# Patient Record
Sex: FEMALE | Race: WHITE | NOT HISPANIC OR LATINO | Employment: OTHER | URBAN - METROPOLITAN AREA
[De-identification: names, ages, dates, MRNs, and addresses within clinical notes are randomized per-mention and may not be internally consistent; named-entity substitution may affect disease eponyms.]

---

## 2023-05-09 LAB
ALANINE AMINOTRANSFERASE (SGPT) (U/L) IN SER/PLAS: 22 U/L (ref 7–45)
ALBUMIN (G/DL) IN SER/PLAS: 4 G/DL (ref 3.4–5)
ALKALINE PHOSPHATASE (U/L) IN SER/PLAS: 175 U/L (ref 33–136)
ANION GAP IN SER/PLAS: 11 MMOL/L (ref 10–20)
ASPARTATE AMINOTRANSFERASE (SGOT) (U/L) IN SER/PLAS: 25 U/L (ref 9–39)
BASOPHILS (10*3/UL) IN BLOOD BY AUTOMATED COUNT: 0.02 X10E9/L (ref 0–0.1)
BASOPHILS/100 LEUKOCYTES IN BLOOD BY AUTOMATED COUNT: 0.5 % (ref 0–2)
BILIRUBIN TOTAL (MG/DL) IN SER/PLAS: 0.6 MG/DL (ref 0–1.2)
CALCIUM (MG/DL) IN SER/PLAS: 9.3 MG/DL (ref 8.6–10.3)
CARBON DIOXIDE, TOTAL (MMOL/L) IN SER/PLAS: 28 MMOL/L (ref 21–32)
CHLORIDE (MMOL/L) IN SER/PLAS: 104 MMOL/L (ref 98–107)
CREATININE (MG/DL) IN SER/PLAS: 0.83 MG/DL (ref 0.5–1.05)
EOSINOPHILS (10*3/UL) IN BLOOD BY AUTOMATED COUNT: 0.11 X10E9/L (ref 0–0.7)
EOSINOPHILS/100 LEUKOCYTES IN BLOOD BY AUTOMATED COUNT: 2.8 % (ref 0–6)
ERYTHROCYTE DISTRIBUTION WIDTH (RATIO) BY AUTOMATED COUNT: 13.8 % (ref 11.5–14.5)
ERYTHROCYTE MEAN CORPUSCULAR HEMOGLOBIN CONCENTRATION (G/DL) BY AUTOMATED: 32.3 G/DL (ref 32–36)
ERYTHROCYTE MEAN CORPUSCULAR VOLUME (FL) BY AUTOMATED COUNT: 91 FL (ref 80–100)
ERYTHROCYTES (10*6/UL) IN BLOOD BY AUTOMATED COUNT: 4.61 X10E12/L (ref 4–5.2)
GFR FEMALE: 77 ML/MIN/1.73M2
GLUCOSE (MG/DL) IN SER/PLAS: 123 MG/DL (ref 74–99)
HEMATOCRIT (%) IN BLOOD BY AUTOMATED COUNT: 42.1 % (ref 36–46)
HEMOGLOBIN (G/DL) IN BLOOD: 13.6 G/DL (ref 12–16)
IMMATURE GRANULOCYTES/100 LEUKOCYTES IN BLOOD BY AUTOMATED COUNT: 0.3 % (ref 0–0.9)
LEUKOCYTES (10*3/UL) IN BLOOD BY AUTOMATED COUNT: 3.9 X10E9/L (ref 4.4–11.3)
LYMPHOCYTES (10*3/UL) IN BLOOD BY AUTOMATED COUNT: 0.82 X10E9/L (ref 1.2–4.8)
LYMPHOCYTES/100 LEUKOCYTES IN BLOOD BY AUTOMATED COUNT: 21.2 % (ref 13–44)
MONOCYTES (10*3/UL) IN BLOOD BY AUTOMATED COUNT: 0.48 X10E9/L (ref 0.1–1)
MONOCYTES/100 LEUKOCYTES IN BLOOD BY AUTOMATED COUNT: 12.4 % (ref 2–10)
NEUTROPHILS (10*3/UL) IN BLOOD BY AUTOMATED COUNT: 2.43 X10E9/L (ref 1.2–7.7)
NEUTROPHILS/100 LEUKOCYTES IN BLOOD BY AUTOMATED COUNT: 62.8 % (ref 40–80)
NRBC (PER 100 WBCS) BY AUTOMATED COUNT: 0 /100 WBC (ref 0–0)
PLATELETS (10*3/UL) IN BLOOD AUTOMATED COUNT: 220 X10E9/L (ref 150–450)
POTASSIUM (MMOL/L) IN SER/PLAS: 4.4 MMOL/L (ref 3.5–5.3)
PROTEIN TOTAL: 7 G/DL (ref 6.4–8.2)
SODIUM (MMOL/L) IN SER/PLAS: 139 MMOL/L (ref 136–145)
UREA NITROGEN (MG/DL) IN SER/PLAS: 14 MG/DL (ref 6–23)

## 2023-05-26 LAB
ALBUMIN (MG/L) IN URINE: <7 MG/L
ALBUMIN/CREATININE (UG/MG) IN URINE: NORMAL UG/MG CRT (ref 0–30)
CHOLESTEROL (MG/DL) IN SER/PLAS: 141 MG/DL (ref 0–199)
CHOLESTEROL IN HDL (MG/DL) IN SER/PLAS: 57.1 MG/DL
CHOLESTEROL/HDL RATIO: 2.5
CREATININE (MG/DL) IN URINE: 90.5 MG/DL (ref 20–320)
ESTIMATED AVERAGE GLUCOSE FOR HBA1C: 171 MG/DL
HEMOGLOBIN A1C/HEMOGLOBIN TOTAL IN BLOOD: 7.6 %
LDL: 69 MG/DL (ref 0–99)
TRIGLYCERIDE (MG/DL) IN SER/PLAS: 76 MG/DL (ref 0–149)
VLDL: 15 MG/DL (ref 0–40)

## 2023-07-10 ENCOUNTER — OFFICE VISIT (OUTPATIENT)
Dept: PRIMARY CARE | Facility: CLINIC | Age: 68
End: 2023-07-10
Payer: MEDICARE

## 2023-07-10 VITALS
OXYGEN SATURATION: 96 % | BODY MASS INDEX: 27.6 KG/M2 | DIASTOLIC BLOOD PRESSURE: 71 MMHG | WEIGHT: 150 LBS | SYSTOLIC BLOOD PRESSURE: 110 MMHG | HEIGHT: 62 IN | HEART RATE: 74 BPM

## 2023-07-10 DIAGNOSIS — Z12.11 ENCOUNTER FOR SCREENING FOR MALIGNANT NEOPLASM OF COLON: ICD-10-CM

## 2023-07-10 DIAGNOSIS — E11.9 CONTROLLED TYPE 2 DIABETES MELLITUS WITHOUT COMPLICATION, WITHOUT LONG-TERM CURRENT USE OF INSULIN (MULTI): Primary | ICD-10-CM

## 2023-07-10 PROCEDURE — 3074F SYST BP LT 130 MM HG: CPT | Performed by: FAMILY MEDICINE

## 2023-07-10 PROCEDURE — 3078F DIAST BP <80 MM HG: CPT | Performed by: FAMILY MEDICINE

## 2023-07-10 PROCEDURE — 1159F MED LIST DOCD IN RCRD: CPT | Performed by: FAMILY MEDICINE

## 2023-07-10 PROCEDURE — 1125F AMNT PAIN NOTED PAIN PRSNT: CPT | Performed by: FAMILY MEDICINE

## 2023-07-10 PROCEDURE — 1160F RVW MEDS BY RX/DR IN RCRD: CPT | Performed by: FAMILY MEDICINE

## 2023-07-10 PROCEDURE — 99214 OFFICE O/P EST MOD 30 MIN: CPT | Performed by: FAMILY MEDICINE

## 2023-07-10 PROCEDURE — 3051F HG A1C>EQUAL 7.0%<8.0%: CPT | Performed by: FAMILY MEDICINE

## 2023-07-10 RX ORDER — FLUTICASONE PROPIONATE AND SALMETEROL 50; 250 UG/1; UG/1
POWDER RESPIRATORY (INHALATION)
COMMUNITY
Start: 2023-06-26 | End: 2024-01-08 | Stop reason: ALTCHOICE

## 2023-07-10 RX ORDER — LOVASTATIN 40 MG/1
40 TABLET ORAL EVERY EVENING
COMMUNITY

## 2023-07-10 RX ORDER — FLUTICASONE PROPIONATE 50 MCG
2 SPRAY, SUSPENSION (ML) NASAL 2 TIMES DAILY
COMMUNITY

## 2023-07-10 RX ORDER — OMEPRAZOLE 20 MG/1
20 CAPSULE, DELAYED RELEASE ORAL DAILY
COMMUNITY

## 2023-07-10 RX ORDER — LANCETS 33 GAUGE
EACH MISCELLANEOUS DAILY
COMMUNITY
Start: 2023-07-01

## 2023-07-10 RX ORDER — LANCETS
EACH MISCELLANEOUS DAILY
COMMUNITY
Start: 2023-01-16 | End: 2024-02-27 | Stop reason: WASHOUT

## 2023-07-10 RX ORDER — MINERAL OIL AND WHITE PETROLATUM 30; 940 MG/G; MG/G
OINTMENT OPHTHALMIC
COMMUNITY
Start: 2023-07-01

## 2023-07-10 RX ORDER — LANCETS 30 GAUGE
EACH MISCELLANEOUS
COMMUNITY
Start: 2023-01-16

## 2023-07-10 RX ORDER — ALBUTEROL SULFATE 90 UG/1
2 AEROSOL, METERED RESPIRATORY (INHALATION) EVERY 4 HOURS PRN
COMMUNITY

## 2023-07-10 RX ORDER — BLOOD SUGAR DIAGNOSTIC
STRIP MISCELLANEOUS
COMMUNITY
Start: 2023-07-01

## 2023-07-10 RX ORDER — METFORMIN HYDROCHLORIDE 500 MG/1
500 TABLET ORAL 2 TIMES DAILY
COMMUNITY
Start: 2023-05-28 | End: 2024-02-27 | Stop reason: SINTOL

## 2023-07-10 RX ORDER — ATENOLOL 50 MG/1
25 TABLET ORAL DAILY
COMMUNITY

## 2023-07-10 ASSESSMENT — ENCOUNTER SYMPTOMS
RESPIRATORY NEGATIVE: 1
MUSCULOSKELETAL NEGATIVE: 1
NEUROLOGICAL NEGATIVE: 1
GASTROINTESTINAL NEGATIVE: 1

## 2023-07-10 NOTE — PROGRESS NOTES
Subjective   Patient ID: Seble Reynolds is a 67 y.o. female.    HPI  Pt with hx sarcoid and wheeze hx of niddm no complaints no sob patient denies any fever or chills.  Has a history of hypertension hyperlipidemia and elevated blood sugars patient needs follow-up blood work another hemoglobin A1c talked about diet talked about exercise.  Review of Systems   HENT: Negative.     Respiratory: Negative.     Gastrointestinal: Negative.    Genitourinary: Negative.    Musculoskeletal: Negative.    Neurological: Negative.        Objective   Physical Exam  General no acute process no icterus well-hydrated alert active oriented    HEENT normocephalic no palpable tenderness eyes pupils equal reactive light and accommodation extraocular muscles intact no icterus and/or erythema ears benign external auditory canal no gross deformities nose no discharge drainage erythema bleeding throat no erythema.    Heart regular rate and rhythm without S3-S4 or murmur    Lungs clear to auscultation x2 no rales or rhonchi    Abdomen soft nontender nondistended no palpable masses no organomegaly splenomegaly.    Integument no rash no lumps bumps or concerning lesions.    Neurologic no tics tremors or seizures no decreased range of motion or ataxia.    Musculoskeletal good range of motion no gross abnormalities noted  Assessment/Plan   Diagnoses and all orders for this visit:  Controlled type 2 diabetes mellitus without complication, without long-term current use of insulin (CMS/[facility])  -     Basic Metabolic Panel; Future  -     Hepatic Function Panel; Future  -     Lipid Panel; Future  -     Protein, Urine Random; Future  -     Hemoglobin A1C; Future

## 2023-07-31 LAB — NONINV COLON CA DNA+OCC BLD SCRN STL QL: NEGATIVE

## 2023-08-03 ENCOUNTER — LAB (OUTPATIENT)
Dept: LAB | Facility: LAB | Age: 68
End: 2023-08-03
Payer: MEDICARE

## 2023-08-03 DIAGNOSIS — E11.9 CONTROLLED TYPE 2 DIABETES MELLITUS WITHOUT COMPLICATION, WITHOUT LONG-TERM CURRENT USE OF INSULIN (MULTI): ICD-10-CM

## 2023-08-03 LAB
ALANINE AMINOTRANSFERASE (SGPT) (U/L) IN SER/PLAS: 29 U/L (ref 7–45)
ALBUMIN (G/DL) IN SER/PLAS: 4.4 G/DL (ref 3.4–5)
ALKALINE PHOSPHATASE (U/L) IN SER/PLAS: 168 U/L (ref 33–136)
ANION GAP IN SER/PLAS: 13 MMOL/L (ref 10–20)
ASPARTATE AMINOTRANSFERASE (SGOT) (U/L) IN SER/PLAS: 27 U/L (ref 9–39)
BILIRUBIN DIRECT (MG/DL) IN SER/PLAS: 0.2 MG/DL (ref 0–0.3)
BILIRUBIN TOTAL (MG/DL) IN SER/PLAS: 0.8 MG/DL (ref 0–1.2)
CALCIUM (MG/DL) IN SER/PLAS: 9.5 MG/DL (ref 8.6–10.3)
CARBON DIOXIDE, TOTAL (MMOL/L) IN SER/PLAS: 28 MMOL/L (ref 21–32)
CHLORIDE (MMOL/L) IN SER/PLAS: 101 MMOL/L (ref 98–107)
CHOLESTEROL (MG/DL) IN SER/PLAS: 163 MG/DL (ref 0–199)
CHOLESTEROL IN HDL (MG/DL) IN SER/PLAS: 62.4 MG/DL
CHOLESTEROL/HDL RATIO: 2.6
CREATININE (MG/DL) IN SER/PLAS: 0.8 MG/DL (ref 0.5–1.05)
CREATININE (MG/DL) IN URINE: 77.2 MG/DL (ref 20–320)
ESTIMATED AVERAGE GLUCOSE FOR HBA1C: 143 MG/DL
GFR FEMALE: 80 ML/MIN/1.73M2
GLUCOSE (MG/DL) IN SER/PLAS: 117 MG/DL (ref 74–99)
HEMOGLOBIN A1C/HEMOGLOBIN TOTAL IN BLOOD: 6.6 %
LDL: 80 MG/DL (ref 0–99)
POTASSIUM (MMOL/L) IN SER/PLAS: 4.3 MMOL/L (ref 3.5–5.3)
PROTEIN (MG/DL) IN URINE: 9 MG/DL (ref 5–24)
PROTEIN TOTAL: 7.3 G/DL (ref 6.4–8.2)
PROTEIN/CREATININE (MG/MG) IN URINE: 0.12 MG/MG CREAT (ref 0–0.17)
SODIUM (MMOL/L) IN SER/PLAS: 138 MMOL/L (ref 136–145)
TRIGLYCERIDE (MG/DL) IN SER/PLAS: 101 MG/DL (ref 0–149)
UREA NITROGEN (MG/DL) IN SER/PLAS: 12 MG/DL (ref 6–23)
VLDL: 20 MG/DL (ref 0–40)

## 2023-08-03 PROCEDURE — 82570 ASSAY OF URINE CREATININE: CPT

## 2023-08-03 PROCEDURE — 36415 COLL VENOUS BLD VENIPUNCTURE: CPT

## 2023-08-03 PROCEDURE — 80048 BASIC METABOLIC PNL TOTAL CA: CPT

## 2023-08-03 PROCEDURE — 80061 LIPID PANEL: CPT

## 2023-08-03 PROCEDURE — 83036 HEMOGLOBIN GLYCOSYLATED A1C: CPT

## 2023-08-03 PROCEDURE — 80076 HEPATIC FUNCTION PANEL: CPT

## 2023-08-03 PROCEDURE — 84156 ASSAY OF PROTEIN URINE: CPT

## 2023-08-21 ENCOUNTER — TELEPHONE (OUTPATIENT)
Dept: PRIMARY CARE | Facility: CLINIC | Age: 68
End: 2023-08-21
Payer: MEDICARE

## 2023-08-21 NOTE — TELEPHONE ENCOUNTER
Pt is hoping for new prescription for Pepcid. She stopped taking her Omeprazole, and wants to try something else.

## 2023-09-01 DIAGNOSIS — K21.9 GASTROESOPHAGEAL REFLUX DISEASE WITHOUT ESOPHAGITIS: Primary | ICD-10-CM

## 2023-09-01 RX ORDER — FAMOTIDINE 20 MG/1
20 TABLET, FILM COATED ORAL 2 TIMES DAILY
Qty: 60 TABLET | Refills: 5 | Status: SHIPPED | OUTPATIENT
Start: 2023-09-01 | End: 2024-01-08 | Stop reason: WASHOUT

## 2023-09-03 PROBLEM — K21.9 GERD (GASTROESOPHAGEAL REFLUX DISEASE): Status: ACTIVE | Noted: 2023-09-03

## 2023-09-03 PROBLEM — I25.10 ASCVD (ARTERIOSCLEROTIC CARDIOVASCULAR DISEASE): Status: ACTIVE | Noted: 2023-09-03

## 2023-09-03 PROBLEM — H02.88A MEIBOMIAN GLAND DYSFUNCTION (MGD) OF UPPER AND LOWER EYELID OF RIGHT EYE: Status: ACTIVE | Noted: 2023-09-03

## 2023-09-03 PROBLEM — Z78.0 POSTMENOPAUSAL ESTROGEN DEFICIENCY: Status: ACTIVE | Noted: 2023-09-03

## 2023-09-03 PROBLEM — I26.99 PULMONARY EMBOLISM (MULTI): Status: ACTIVE | Noted: 2023-09-03

## 2023-09-03 PROBLEM — M54.16 LUMBAR RADICULOPATHY, CHRONIC: Status: ACTIVE | Noted: 2023-09-03

## 2023-09-03 PROBLEM — H18.529 ABMD (ANTERIOR BASEMENT MEMBRANE DYSTROPHY): Status: ACTIVE | Noted: 2023-09-03

## 2023-09-03 PROBLEM — Z95.0 PRESENCE OF CARDIAC PACEMAKER: Status: ACTIVE | Noted: 2023-09-03

## 2023-09-03 PROBLEM — N63.0 BREAST NODULE: Status: ACTIVE | Noted: 2023-09-03

## 2023-09-03 PROBLEM — G60.8 POLYNEUROPATHY, PERIPHERAL SENSORIMOTOR AXONAL: Status: ACTIVE | Noted: 2023-09-03

## 2023-09-03 PROBLEM — J96.00 ACUTE RESPIRATORY FAILURE, UNSP W HYPOXIA OR HYPERCAPNIA (MULTI): Status: ACTIVE | Noted: 2023-09-03

## 2023-09-03 PROBLEM — E78.1 ENDOGENOUS HYPERLIPIDEMIA: Status: ACTIVE | Noted: 2023-09-03

## 2023-09-03 PROBLEM — J45.909 ASTHMA (HHS-HCC): Status: ACTIVE | Noted: 2023-09-03

## 2023-09-03 PROBLEM — M54.12 CHRONIC CERVICAL RADICULOPATHY: Status: ACTIVE | Noted: 2023-09-03

## 2023-09-03 PROBLEM — R06.02 SHORT OF BREATH ON EXERTION: Status: ACTIVE | Noted: 2023-09-03

## 2023-09-03 PROBLEM — I25.10 CORONARY ARTERY CALCIFICATION SEEN ON CAT SCAN: Status: ACTIVE | Noted: 2023-09-03

## 2023-09-03 PROBLEM — R01.1 HEART MURMUR: Status: ACTIVE | Noted: 2023-09-03

## 2023-09-03 PROBLEM — R74.8 ELEVATED ALKALINE PHOSPHATASE LEVEL: Status: ACTIVE | Noted: 2023-09-03

## 2023-09-03 PROBLEM — D86.89 SARCOIDOSIS WITH GRANULOMATOUS HEPATITIS: Status: ACTIVE | Noted: 2023-09-03

## 2023-09-03 PROBLEM — M65.322 TRIGGER INDEX FINGER OF LEFT HAND: Status: ACTIVE | Noted: 2023-09-03

## 2023-09-03 PROBLEM — M24.849 LOCKING FINGER JOINT: Status: ACTIVE | Noted: 2023-09-03

## 2023-09-03 PROBLEM — I44.2 COMPLETE HEART BLOCK (MULTI): Status: ACTIVE | Noted: 2023-09-03

## 2023-09-03 PROBLEM — E11.9 DIABETES MELLITUS, STABLE (MULTI): Status: ACTIVE | Noted: 2023-09-03

## 2023-09-03 PROBLEM — R00.2 PALPITATIONS: Status: ACTIVE | Noted: 2023-09-03

## 2023-09-03 PROBLEM — D86.0 SARCOIDOSIS, LUNG (MULTI): Status: ACTIVE | Noted: 2023-09-03

## 2023-09-03 PROBLEM — H02.88B MEIBOMIAN GLAND DYSFUNCTION (MGD) OF UPPER AND LOWER EYELID OF LEFT EYE: Status: ACTIVE | Noted: 2023-09-03

## 2023-09-03 RX ORDER — PREDNISOLONE ACETATE 10 MG/ML
1 SUSPENSION/ DROPS OPHTHALMIC 4 TIMES DAILY
COMMUNITY
Start: 2023-07-27 | End: 2024-01-08 | Stop reason: ALTCHOICE

## 2023-09-03 RX ORDER — ACETAMINOPHEN 500 MG
TABLET ORAL DAILY
COMMUNITY
Start: 2008-06-05 | End: 2024-01-08 | Stop reason: ALTCHOICE

## 2023-09-03 RX ORDER — LEVALBUTEROL TARTRATE 45 UG/1
2 AEROSOL, METERED ORAL 3 TIMES DAILY PRN
COMMUNITY
Start: 2023-08-02 | End: 2024-01-08 | Stop reason: ALTCHOICE

## 2023-09-03 RX ORDER — FLUTICASONE PROPIONATE AND SALMETEROL 100; 50 UG/1; UG/1
1 POWDER RESPIRATORY (INHALATION)
COMMUNITY
End: 2024-01-08 | Stop reason: ALTCHOICE

## 2023-09-03 RX ORDER — DILTIAZEM HYDROCHLORIDE 120 MG/1
120 CAPSULE, COATED, EXTENDED RELEASE ORAL EVERY 12 HOURS
COMMUNITY
Start: 2023-08-25 | End: 2023-10-05 | Stop reason: ALTCHOICE

## 2023-09-03 RX ORDER — ASPIRIN 81 MG/1
1-2 TABLET ORAL DAILY
COMMUNITY
Start: 2008-06-05 | End: 2024-01-08 | Stop reason: ALTCHOICE

## 2023-09-03 RX ORDER — IBUPROFEN 200 MG
TABLET ORAL CONTINUOUS PRN
COMMUNITY
Start: 2008-06-05

## 2023-09-03 RX ORDER — FLUTICASONE PROPIONATE AND SALMETEROL 50; 500 UG/1; UG/1
1 POWDER RESPIRATORY (INHALATION)
Status: ON HOLD | COMMUNITY
Start: 2023-08-02 | End: 2024-04-16 | Stop reason: ALTCHOICE

## 2023-09-28 LAB — THYROTROPIN (MIU/L) IN SER/PLAS BY DETECTION LIMIT <= 0.05 MIU/L: 1.99 MIU/L (ref 0.44–3.98)

## 2023-10-05 DIAGNOSIS — I47.11 INAPPROPRIATE SINUS TACHYCARDIA (CMS-HCC): ICD-10-CM

## 2023-10-05 DIAGNOSIS — R00.2 PALPITATIONS: ICD-10-CM

## 2023-10-05 DIAGNOSIS — I47.10 PAROXYSMAL SUPRAVENTRICULAR TACHYCARDIA (CMS-HCC): Primary | ICD-10-CM

## 2023-10-10 ENCOUNTER — HOSPITAL ENCOUNTER (OUTPATIENT)
Dept: CARDIOLOGY | Facility: CLINIC | Age: 68
Discharge: HOME | End: 2023-10-10
Payer: MEDICARE

## 2023-10-10 DIAGNOSIS — Z95.0 PRESENCE OF CARDIAC PACEMAKER: ICD-10-CM

## 2023-10-10 DIAGNOSIS — I49.5 SICK SINUS SYNDROME (MULTI): ICD-10-CM

## 2023-10-10 DIAGNOSIS — I47.20 VENTRICULAR TACHYCARDIA (MULTI): ICD-10-CM

## 2023-10-10 PROCEDURE — 93290 INTERROG DEV EVAL ICPMS IP: CPT

## 2023-10-10 PROCEDURE — 93290 INTERROG DEV EVAL ICPMS IP: CPT | Performed by: INTERNAL MEDICINE

## 2023-10-10 PROCEDURE — 93280 PM DEVICE PROGR EVAL DUAL: CPT | Performed by: INTERNAL MEDICINE

## 2023-10-16 DIAGNOSIS — I47.20 VENTRICULAR TACHYCARDIA (MULTI): ICD-10-CM

## 2023-10-16 DIAGNOSIS — Z95.0 PRESENCE OF CARDIAC PACEMAKER: ICD-10-CM

## 2023-10-16 DIAGNOSIS — I49.5 SICK SINUS SYNDROME (MULTI): ICD-10-CM

## 2023-11-20 DIAGNOSIS — I47.11 INAPPROPRIATE SINUS TACHYCARDIA (CMS-HCC): ICD-10-CM

## 2023-12-29 ENCOUNTER — APPOINTMENT (OUTPATIENT)
Dept: PRIMARY CARE | Facility: CLINIC | Age: 68
End: 2023-12-29
Payer: MEDICARE

## 2024-01-08 ENCOUNTER — OFFICE VISIT (OUTPATIENT)
Dept: CARDIOLOGY | Facility: CLINIC | Age: 69
End: 2024-01-08
Payer: MEDICARE

## 2024-01-08 VITALS
DIASTOLIC BLOOD PRESSURE: 66 MMHG | OXYGEN SATURATION: 93 % | HEART RATE: 90 BPM | BODY MASS INDEX: 26.5 KG/M2 | SYSTOLIC BLOOD PRESSURE: 130 MMHG | HEIGHT: 62 IN | WEIGHT: 144 LBS

## 2024-01-08 DIAGNOSIS — I47.20 VENTRICULAR TACHYCARDIA (MULTI): ICD-10-CM

## 2024-01-08 DIAGNOSIS — R06.02 SHORT OF BREATH ON EXERTION: ICD-10-CM

## 2024-01-08 DIAGNOSIS — I44.2 COMPLETE HEART BLOCK (MULTI): Primary | ICD-10-CM

## 2024-01-08 PROCEDURE — 1125F AMNT PAIN NOTED PAIN PRSNT: CPT | Performed by: INTERNAL MEDICINE

## 2024-01-08 PROCEDURE — 99213 OFFICE O/P EST LOW 20 MIN: CPT | Performed by: INTERNAL MEDICINE

## 2024-01-08 PROCEDURE — 3078F DIAST BP <80 MM HG: CPT | Performed by: INTERNAL MEDICINE

## 2024-01-08 PROCEDURE — 1159F MED LIST DOCD IN RCRD: CPT | Performed by: INTERNAL MEDICINE

## 2024-01-08 PROCEDURE — 93000 ELECTROCARDIOGRAM COMPLETE: CPT | Performed by: INTERNAL MEDICINE

## 2024-01-08 PROCEDURE — 3075F SYST BP GE 130 - 139MM HG: CPT | Performed by: INTERNAL MEDICINE

## 2024-01-08 NOTE — ASSESSMENT & PLAN NOTE
1.  Complete heart block: Seble has a history of symptomatic complete heart block and has a Medtronic dual-chamber permanent pacemaker which was implanted in April 2016.  The device is functioning appropriately and the battery status is stable.  Continue follow-up as scheduled through the cardiac device clinic.    2.  Inappropriate sinus tachycardia: This patient has had IST which was refractory to the combination of atenolol plus diltiazem.  However, with the addition of Corlanor 5 mg twice daily her resting heart rate is now in the 70 to 80 bpm range.  She will continue the Corlanor and atenolol.  Diltiazem was discontinued at the time Corlanor was initiated.  Follow-up in 1 year.

## 2024-01-08 NOTE — PROGRESS NOTES
"History Of Present Illness:      This is a 68-year-old female with a history of complete heart block.  The patient also developed inappropriate sinus tachycardia and is feeling much better with the use of Corlanor 5 mg twice daily.  Her resting heart rate is now in the 70s.  No other cardiac complaints at this time.    Review of Systems  Other review of systems negative     Last Recorded Vitals:      10/25/2022     9:26 AM 11/21/2022    11:57 AM 1/16/2023     1:23 PM 1/23/2023     2:07 PM 5/22/2023     9:01 AM 7/10/2023     1:34 PM 1/8/2024    10:00 AM   Vitals   Systolic 94 104 114 110 110 110 130   Diastolic 68 64 70 70 64 71 66   Heart Rate 88 82 100 90 92 74 90   Temp 35.8 °C (96.4 °F) 35.7 °C (96.2 °F) 36.3 °C (97.3 °F)  35.4 °C (95.7 °F)     Height (in) 1.575 m (5' 2\") 1.575 m (5' 2\") 1.575 m (5' 2\") 1.575 m (5' 2\") 1.575 m (5' 2\") 1.575 m (5' 2\") 1.575 m (5' 2\")   Weight (lb) 160 159.19 154.13 158 155.56 150 144   BMI 29.26 kg/m2 29.12 kg/m2 28.19 kg/m2 28.9 kg/m2 28.45 kg/m2 27.44 kg/m2 26.34 kg/m2   BSA (m2) 1.78 m2 1.78 m2 1.75 m2 1.77 m2 1.76 m2 1.72 m2 1.69 m2   Visit Report      Report Report          Allergies:  Patient has no known allergies.    Outpatient Medications:  Current Outpatient Medications   Medication Instructions    Advair Diskus 500-50 mcg/dose diskus inhaler 1 puff, inhalation, 2 times daily RT    albuterol 90 mcg/actuation inhaler 2 puffs, inhalation, Every 4 hours PRN    atenolol (TENORMIN) 25 mg, oral, Daily    famotidine (PEPCID) 20 mg, oral, 2 times daily    fluticasone (Flonase) 50 mcg/actuation nasal spray 2 sprays, Each Nostril, 2 times daily    ibuprofen 200 mg tablet oral, Continuous PRN    ivabradine (CORLANOR) 5 mg, oral, 2 times daily    lovastatin (MEVACOR) 40 mg, oral, Every evening    metFORMIN (GLUCOPHAGE) 500 mg, oral, 2 times daily    omeprazole (PRILOSEC) 20 mg, oral, Daily    OneTouch Delica Plus Lancet 33 gauge misc Daily    OneTouch Ultra Test strip TEST ONCE " DAILY    OneTouch Ultra2 Meter misc USE AS DIRECTED.    OneTouch UltraSoft Lancets misc Daily    Systane Nighttime 94-3 % ointment ophthalmic ointment APPLY 1/2 INCH RIBBON IN LOWER CONJUNCTIVAL SAC AT BEDTIME NIGHTLY.       Physical Exam:    General Appearance:  Alert, oriented, no distress  Skin:  Warm and dry  Head and Neck:  No elevation of JVP, no carotid bruits  Cardiac Exam:  Rhythm is regular, S1 and S2 are normal, no murmur S3 or S4  Lungs:  Clear to auscultation  Extremities:  no edema  Neurologic:  No focal deficits  Psychiatric:  Appropriate mood and behavior         Cardiology Tests:  I have personally review the diagnostic cardiac testing and my interpretation is as follows:    EKG: Sinus rhythm with AV paced rhythm      Assessment/Plan   Problem List Items Addressed This Visit             ICD-10-CM    Complete heart block (CMS/HCC) - Primary I44.2     1.  Complete heart block: Sbele has a history of symptomatic complete heart block and has a Medtronic dual-chamber permanent pacemaker which was implanted in April 2016.  The device is functioning appropriately and the battery status is stable.  Continue follow-up as scheduled through the cardiac device clinic.    2.  Inappropriate sinus tachycardia: This patient has had IST which was refractory to the combination of atenolol plus diltiazem.  However, with the addition of Corlanor 5 mg twice daily her resting heart rate is now in the 70 to 80 bpm range.  She will continue the Corlanor and atenolol.  Diltiazem was discontinued at the time Corlanor was initiated.  Follow-up in 1 year.         Relevant Orders    Transthoracic echo (TTE) complete    Short of breath on exertion R06.02     Other Visit Diagnoses         Codes    Ventricular tachycardia (CMS/HCC)     I47.20    Relevant Orders    ECG 12 lead (Clinic Performed) (Completed)            James C Ramicone, DO

## 2024-01-11 ENCOUNTER — APPOINTMENT (OUTPATIENT)
Dept: PRIMARY CARE | Facility: CLINIC | Age: 69
End: 2024-01-11
Payer: MEDICARE

## 2024-01-31 ENCOUNTER — APPOINTMENT (OUTPATIENT)
Dept: PRIMARY CARE | Facility: CLINIC | Age: 69
End: 2024-01-31
Payer: MEDICARE

## 2024-02-27 ENCOUNTER — OFFICE VISIT (OUTPATIENT)
Dept: PRIMARY CARE | Facility: CLINIC | Age: 69
End: 2024-02-27
Payer: MEDICARE

## 2024-02-27 VITALS
BODY MASS INDEX: 26.68 KG/M2 | SYSTOLIC BLOOD PRESSURE: 122 MMHG | TEMPERATURE: 97.3 F | OXYGEN SATURATION: 96 % | WEIGHT: 145 LBS | HEART RATE: 66 BPM | DIASTOLIC BLOOD PRESSURE: 77 MMHG | HEIGHT: 62 IN

## 2024-02-27 DIAGNOSIS — R25.2 MUSCLE CRAMP: ICD-10-CM

## 2024-02-27 DIAGNOSIS — D86.0 SARCOIDOSIS, LUNG (MULTI): ICD-10-CM

## 2024-02-27 DIAGNOSIS — J45.20 MILD INTERMITTENT ASTHMA WITHOUT COMPLICATION (HHS-HCC): ICD-10-CM

## 2024-02-27 DIAGNOSIS — I44.2 COMPLETE HEART BLOCK (MULTI): Primary | ICD-10-CM

## 2024-02-27 DIAGNOSIS — M79.642 HAND PAIN, LEFT: ICD-10-CM

## 2024-02-27 DIAGNOSIS — K21.9 GASTROESOPHAGEAL REFLUX DISEASE WITHOUT ESOPHAGITIS: ICD-10-CM

## 2024-02-27 DIAGNOSIS — E11.9 TYPE 2 DIABETES MELLITUS WITHOUT COMPLICATION, WITHOUT LONG-TERM CURRENT USE OF INSULIN (MULTI): ICD-10-CM

## 2024-02-27 PROBLEM — R06.02 SHORT OF BREATH ON EXERTION: Status: RESOLVED | Noted: 2023-09-03 | Resolved: 2024-02-27

## 2024-02-27 PROBLEM — N63.0 BREAST NODULE: Status: RESOLVED | Noted: 2023-09-03 | Resolved: 2024-02-27

## 2024-02-27 PROBLEM — Z78.0 POSTMENOPAUSAL ESTROGEN DEFICIENCY: Status: RESOLVED | Noted: 2023-09-03 | Resolved: 2024-02-27

## 2024-02-27 PROBLEM — M24.849 LOCKING FINGER JOINT: Status: RESOLVED | Noted: 2023-09-03 | Resolved: 2024-02-27

## 2024-02-27 PROBLEM — I26.99 PULMONARY EMBOLISM (MULTI): Status: RESOLVED | Noted: 2023-09-03 | Resolved: 2024-02-27

## 2024-02-27 PROBLEM — M65.322 TRIGGER INDEX FINGER OF LEFT HAND: Status: RESOLVED | Noted: 2023-09-03 | Resolved: 2024-02-27

## 2024-02-27 PROCEDURE — 1036F TOBACCO NON-USER: CPT | Performed by: INTERNAL MEDICINE

## 2024-02-27 PROCEDURE — 1125F AMNT PAIN NOTED PAIN PRSNT: CPT | Performed by: INTERNAL MEDICINE

## 2024-02-27 PROCEDURE — 1159F MED LIST DOCD IN RCRD: CPT | Performed by: INTERNAL MEDICINE

## 2024-02-27 PROCEDURE — 3078F DIAST BP <80 MM HG: CPT | Performed by: INTERNAL MEDICINE

## 2024-02-27 PROCEDURE — 3074F SYST BP LT 130 MM HG: CPT | Performed by: INTERNAL MEDICINE

## 2024-02-27 PROCEDURE — G2211 COMPLEX E/M VISIT ADD ON: HCPCS | Performed by: INTERNAL MEDICINE

## 2024-02-27 PROCEDURE — 99214 OFFICE O/P EST MOD 30 MIN: CPT | Performed by: INTERNAL MEDICINE

## 2024-02-27 RX ORDER — METFORMIN HYDROCHLORIDE 500 MG/1
500 TABLET, EXTENDED RELEASE ORAL
Qty: 180 TABLET | Refills: 1 | Status: SHIPPED | OUTPATIENT
Start: 2024-02-27 | End: 2025-02-26

## 2024-02-27 ASSESSMENT — PATIENT HEALTH QUESTIONNAIRE - PHQ9
SUM OF ALL RESPONSES TO PHQ9 QUESTIONS 1 & 2: 0
2. FEELING DOWN, DEPRESSED OR HOPELESS: NOT AT ALL
1. LITTLE INTEREST OR PLEASURE IN DOING THINGS: NOT AT ALL

## 2024-02-27 NOTE — PROGRESS NOTES
"Subjective   Patient ID: Seble Reynolds is a 68 y.o. female who presents for Establish Care.    HPI   H/o CHB s/p PPM, DM, asthma, GERD, sarcoidosis.    Feet cramp at night. Drinks 72 oz water/day.    Has had loose Bms since starting metformin 1 year ago.  Sees Optho regularly  Doesn't see Podiatry  FBS around 100-120.    Has pain at 2nd MCP joint for the past few months. Tylenol doesn't help much. Ibuprofen doesn't help.        Review of Systems    Objective   /77   Pulse 66   Temp 36.3 °C (97.3 °F)   Ht 1.575 m (5' 2\")   Wt 65.8 kg (145 lb)   SpO2 96%   BMI 26.52 kg/m²     Physical Exam  Constitutional:       Appearance: Normal appearance.   Eyes:      Extraocular Movements: Extraocular movements intact.      Pupils: Pupils are equal, round, and reactive to light.   Cardiovascular:      Rate and Rhythm: Normal rate and regular rhythm.      Heart sounds: No murmur heard.  Pulmonary:      Effort: Pulmonary effort is normal.      Breath sounds: Normal breath sounds.   Musculoskeletal:      Cervical back: Neck supple.      Right lower leg: No edema.      Left lower leg: No edema.      Comments: Tenderness and some bony enlargement over L 2nd MCP joint   Neurological:      General: No focal deficit present.      Mental Status: She is alert.      Cranial Nerves: No cranial nerve deficit.      Motor: No weakness.      Gait: Gait normal.         Assessment/Plan   Problem List Items Addressed This Visit             ICD-10-CM    Asthma J45.909    Complete heart block (CMS/HCC) - Primary I44.2    Type 2 diabetes mellitus without complication, without long-term current use of insulin (CMS/HCC) E11.9    Relevant Medications    metFORMIN XR (Glucophage-XR) 500 mg 24 hr tablet    Other Relevant Orders    Albumin , Urine Random    Comprehensive Metabolic Panel    Hemoglobin A1C    Lipid Panel    GERD (gastroesophageal reflux disease) K21.9    Relevant Orders    CBC    Sarcoidosis, lung (CMS/HCC) D86.0    Hand pain, left " M79.642    Relevant Orders    XR hand left 1-2 views     Other Visit Diagnoses         Codes    Muscle cramp     R25.2    Relevant Orders    Magnesium    CK            DM  -Check A1C  -Switch to ER metformin. I asked her to contact me if diarrhea doesn't resolve with this.  -Sees Opth yearly    Sarcoid - Sees Dr Aquino    GERD - continue PPI, never had EGD    CHB s/p PPM - Sees Dr Ramicone    Asthma    Sarcoidosis  - Sees Dr Aquino    HLP - continue lovastatin, was never on other statins     Muscle cramps - check labs. If ok,can try going off statin for 2 weeks. Advised to notify me if she does    2nd R MCP joint pain - likely OA, will check xray    HM:  -Cologuard neg 7/2023  -Mamm - nml 8/2023    Follow up 6 months MWV

## 2024-02-29 ENCOUNTER — HOSPITAL ENCOUNTER (OUTPATIENT)
Dept: RADIOLOGY | Facility: CLINIC | Age: 69
Discharge: HOME | End: 2024-02-29
Payer: MEDICARE

## 2024-02-29 DIAGNOSIS — M79.642 HAND PAIN, LEFT: ICD-10-CM

## 2024-02-29 PROCEDURE — 73130 X-RAY EXAM OF HAND: CPT | Mod: LT

## 2024-02-29 PROCEDURE — 73130 X-RAY EXAM OF HAND: CPT | Mod: LEFT SIDE | Performed by: RADIOLOGY

## 2024-03-01 ENCOUNTER — PATIENT MESSAGE (OUTPATIENT)
Dept: PRIMARY CARE | Facility: CLINIC | Age: 69
End: 2024-03-01
Payer: MEDICARE

## 2024-03-01 DIAGNOSIS — M79.642 HAND PAIN, LEFT: Primary | ICD-10-CM

## 2024-03-01 RX ORDER — PREDNISONE 10 MG/1
TABLET ORAL
Qty: 18 TABLET | Refills: 0 | Status: SHIPPED | OUTPATIENT
Start: 2024-03-01

## 2024-03-14 ENCOUNTER — LAB (OUTPATIENT)
Dept: LAB | Facility: LAB | Age: 69
End: 2024-03-14
Payer: MEDICARE

## 2024-03-14 DIAGNOSIS — R25.2 MUSCLE CRAMP: ICD-10-CM

## 2024-03-14 DIAGNOSIS — K21.9 GASTROESOPHAGEAL REFLUX DISEASE WITHOUT ESOPHAGITIS: ICD-10-CM

## 2024-03-14 DIAGNOSIS — E11.9 TYPE 2 DIABETES MELLITUS WITHOUT COMPLICATION, WITHOUT LONG-TERM CURRENT USE OF INSULIN (MULTI): ICD-10-CM

## 2024-03-14 LAB
ALBUMIN SERPL BCP-MCNC: 3.8 G/DL (ref 3.4–5)
ALP SERPL-CCNC: 121 U/L (ref 33–136)
ALT SERPL W P-5'-P-CCNC: 28 U/L (ref 7–45)
ANION GAP SERPL CALC-SCNC: 13 MMOL/L (ref 10–20)
AST SERPL W P-5'-P-CCNC: 15 U/L (ref 9–39)
BILIRUB SERPL-MCNC: 0.7 MG/DL (ref 0–1.2)
BUN SERPL-MCNC: 15 MG/DL (ref 6–23)
CALCIUM SERPL-MCNC: 9 MG/DL (ref 8.6–10.3)
CHLORIDE SERPL-SCNC: 104 MMOL/L (ref 98–107)
CHOLEST SERPL-MCNC: 198 MG/DL (ref 0–199)
CHOLESTEROL/HDL RATIO: 2.9
CK SERPL-CCNC: 21 U/L (ref 0–215)
CO2 SERPL-SCNC: 28 MMOL/L (ref 21–32)
CREAT SERPL-MCNC: 0.74 MG/DL (ref 0.5–1.05)
CREAT UR-MCNC: 51.8 MG/DL (ref 20–320)
EGFRCR SERPLBLD CKD-EPI 2021: 88 ML/MIN/1.73M*2
ERYTHROCYTE [DISTWIDTH] IN BLOOD BY AUTOMATED COUNT: 13.7 % (ref 11.5–14.5)
EST. AVERAGE GLUCOSE BLD GHB EST-MCNC: 146 MG/DL
GLUCOSE SERPL-MCNC: 94 MG/DL (ref 74–99)
HBA1C MFR BLD: 6.7 %
HCT VFR BLD AUTO: 40.5 % (ref 36–46)
HDLC SERPL-MCNC: 69.2 MG/DL
HGB BLD-MCNC: 13.4 G/DL (ref 12–16)
LDLC SERPL CALC-MCNC: 106 MG/DL
MAGNESIUM SERPL-MCNC: 2.1 MG/DL (ref 1.6–2.4)
MCH RBC QN AUTO: 29.3 PG (ref 26–34)
MCHC RBC AUTO-ENTMCNC: 33.1 G/DL (ref 32–36)
MCV RBC AUTO: 89 FL (ref 80–100)
MICROALBUMIN UR-MCNC: <7 MG/L
MICROALBUMIN/CREAT UR: NORMAL MG/G{CREAT}
NON HDL CHOLESTEROL: 129 MG/DL (ref 0–149)
NRBC BLD-RTO: 0 /100 WBCS (ref 0–0)
PLATELET # BLD AUTO: 263 X10*3/UL (ref 150–450)
POTASSIUM SERPL-SCNC: 3.9 MMOL/L (ref 3.5–5.3)
PROT SERPL-MCNC: 6.4 G/DL (ref 6.4–8.2)
RBC # BLD AUTO: 4.57 X10*6/UL (ref 4–5.2)
SODIUM SERPL-SCNC: 141 MMOL/L (ref 136–145)
TRIGL SERPL-MCNC: 115 MG/DL (ref 0–149)
VLDL: 23 MG/DL (ref 0–40)
WBC # BLD AUTO: 8.1 X10*3/UL (ref 4.4–11.3)

## 2024-03-14 PROCEDURE — 82550 ASSAY OF CK (CPK): CPT

## 2024-03-14 PROCEDURE — 85027 COMPLETE CBC AUTOMATED: CPT

## 2024-03-14 PROCEDURE — 36415 COLL VENOUS BLD VENIPUNCTURE: CPT

## 2024-03-14 PROCEDURE — 80053 COMPREHEN METABOLIC PANEL: CPT

## 2024-03-14 PROCEDURE — 83735 ASSAY OF MAGNESIUM: CPT

## 2024-03-14 PROCEDURE — 82570 ASSAY OF URINE CREATININE: CPT

## 2024-03-14 PROCEDURE — 83036 HEMOGLOBIN GLYCOSYLATED A1C: CPT

## 2024-03-14 PROCEDURE — 80061 LIPID PANEL: CPT

## 2024-03-14 PROCEDURE — 82043 UR ALBUMIN QUANTITATIVE: CPT

## 2024-03-25 ENCOUNTER — OFFICE VISIT (OUTPATIENT)
Dept: ORTHOPEDIC SURGERY | Facility: CLINIC | Age: 69
End: 2024-03-25
Payer: MEDICARE

## 2024-03-25 VITALS — BODY MASS INDEX: 25.4 KG/M2 | WEIGHT: 138 LBS | HEIGHT: 62 IN

## 2024-03-25 DIAGNOSIS — M65.322 TRIGGER INDEX FINGER OF LEFT HAND: Primary | ICD-10-CM

## 2024-03-25 PROCEDURE — 99213 OFFICE O/P EST LOW 20 MIN: CPT | Performed by: ORTHOPAEDIC SURGERY

## 2024-03-25 PROCEDURE — 3049F LDL-C 100-129 MG/DL: CPT | Performed by: ORTHOPAEDIC SURGERY

## 2024-03-25 PROCEDURE — 3062F POS MACROALBUMINURIA REV: CPT | Performed by: ORTHOPAEDIC SURGERY

## 2024-03-25 PROCEDURE — 3044F HG A1C LEVEL LT 7.0%: CPT | Performed by: ORTHOPAEDIC SURGERY

## 2024-03-25 PROCEDURE — 1036F TOBACCO NON-USER: CPT | Performed by: ORTHOPAEDIC SURGERY

## 2024-03-25 PROCEDURE — 1160F RVW MEDS BY RX/DR IN RCRD: CPT | Performed by: ORTHOPAEDIC SURGERY

## 2024-03-25 PROCEDURE — 1159F MED LIST DOCD IN RCRD: CPT | Performed by: ORTHOPAEDIC SURGERY

## 2024-03-25 RX ORDER — BUPIVACAINE HYDROCHLORIDE 5 MG/ML
10 INJECTION, SOLUTION EPIDURAL; INTRACAUDAL ONCE
Status: CANCELLED | OUTPATIENT
Start: 2024-03-25 | End: 2024-03-25

## 2024-03-25 NOTE — H&P (VIEW-ONLY)
Subjective    Patient ID: Seble Reynolds is a 68 y.o. female.    Chief Complaint: Pain of the Left Hand (X3 MONTHS/NKI)     Last Surgery: No surgery found  Last Surgery Date: No surgery found    HPI  Patient is a 68-year-old left-hand-dominant female who comes in for follow-up of her left index finger trigger digit.  She is undergone 1 previous Kenalog injection 2 years ago.  However her symptoms have returned.  She denies any new trauma.  She denies numbness and paresthesias.      Objective   Ortho Exam  Patient is in no acute distress.  Exam of her left hand and wrist reveals her skin envelope is intact.  She is tender palpation over the index finger A1 pulley.  The finger was triggering with flexion at the PIP joint.  She had no other areas of point tenderness.    Image Results:  XR hand left 3+ views  Narrative: Interpreted By:  Eliot Prasad,   STUDY:  XR HAND LEFT 3+ VIEWS;  2/29/2024 9:23 am      INDICATION:  Signs/Symptoms:Pain at 2nd MCP joint.      COMPARISON:  None.      ACCESSION NUMBER(S):  [id number]      ORDERING CLINICIAN:  LULÚ JOSE      TECHNIQUE:  3 views  of the  left hand were obtained.      FINDINGS:  No significant osteophytic change.  No lytic or blastic destructive bone lesion.  Trace spur formation in the 2nd through 5th DIP joints and the 2nd  PIP joint. There are tiny ligament calcifications along the proximal  medial and lateral corners of the 2nd proximal phalanx at the 2nd MCP  joint. No opaque soft tissue foreign body.  No periosteal reaction or erosion.      Impression: No acute fracture or dislocation.      Trace DJD in the 2nd PIP joint and the 2nd through 5th DIP joints as  described.      Tiny nonspecific ligament calcifications along the proximal medial  and lateral corners of the 2nd proximal phalanx. This could be due to  CPPD or HADD.      MACRO:  None      Signed by: Eliot Prasad 3/1/2024 8:35 AM  Dictation workstation:   [id number]      Assessment/Plan   Encounter  Diagnoses:  Trigger index finger of left hand    Orders Placed This Encounter    Case Request Operating Room: RELEASE,SOFT TISSUE,UPPER EXTREMITY     Patient has recurrence of left index finger trigger digit.  We discussed conservative versus surgical management.  She elected to undergo surgery.  I discussed with her in detail the risk, benefits alternatives of a left index finger A1 pulley release.  The patient voiced understanding and informed consent was obtained.  The patient will call to schedule surgery.

## 2024-03-25 NOTE — PROGRESS NOTES
Subjective    Patient ID: Seble Reynolds is a 68 y.o. female.    Chief Complaint: Pain of the Left Hand (X3 MONTHS/NKI)     Last Surgery: No surgery found  Last Surgery Date: No surgery found    HPI  Patient is a 68-year-old left-hand-dominant female who comes in for follow-up of her left index finger trigger digit.  She is undergone 1 previous Kenalog injection 2 years ago.  However her symptoms have returned.  She denies any new trauma.  She denies numbness and paresthesias.      Objective   Ortho Exam  Patient is in no acute distress.  Exam of her left hand and wrist reveals her skin envelope is intact.  She is tender palpation over the index finger A1 pulley.  The finger was triggering with flexion at the PIP joint.  She had no other areas of point tenderness.    Image Results:  XR hand left 3+ views  Narrative: Interpreted By:  Eliot Prasad,   STUDY:  XR HAND LEFT 3+ VIEWS;  2/29/2024 9:23 am      INDICATION:  Signs/Symptoms:Pain at 2nd MCP joint.      COMPARISON:  None.      ACCESSION NUMBER(S):  [id number]      ORDERING CLINICIAN:  LULÚ JOSE      TECHNIQUE:  3 views  of the  left hand were obtained.      FINDINGS:  No significant osteophytic change.  No lytic or blastic destructive bone lesion.  Trace spur formation in the 2nd through 5th DIP joints and the 2nd  PIP joint. There are tiny ligament calcifications along the proximal  medial and lateral corners of the 2nd proximal phalanx at the 2nd MCP  joint. No opaque soft tissue foreign body.  No periosteal reaction or erosion.      Impression: No acute fracture or dislocation.      Trace DJD in the 2nd PIP joint and the 2nd through 5th DIP joints as  described.      Tiny nonspecific ligament calcifications along the proximal medial  and lateral corners of the 2nd proximal phalanx. This could be due to  CPPD or HADD.      MACRO:  None      Signed by: Eliot Prasad 3/1/2024 8:35 AM  Dictation workstation:   [id number]      Assessment/Plan   Encounter  Diagnoses:  Trigger index finger of left hand    Orders Placed This Encounter    Case Request Operating Room: RELEASE,SOFT TISSUE,UPPER EXTREMITY     Patient has recurrence of left index finger trigger digit.  We discussed conservative versus surgical management.  She elected to undergo surgery.  I discussed with her in detail the risk, benefits alternatives of a left index finger A1 pulley release.  The patient voiced understanding and informed consent was obtained.  The patient will call to schedule surgery.

## 2024-03-26 PROBLEM — M65.322 TRIGGER INDEX FINGER OF LEFT HAND: Status: ACTIVE | Noted: 2024-03-25

## 2024-04-10 ENCOUNTER — PATIENT MESSAGE (OUTPATIENT)
Dept: PRIMARY CARE | Facility: CLINIC | Age: 69
End: 2024-04-10
Payer: MEDICARE

## 2024-04-10 ENCOUNTER — HOSPITAL ENCOUNTER (OUTPATIENT)
Dept: CARDIOLOGY | Facility: CLINIC | Age: 69
Discharge: HOME | End: 2024-04-10
Payer: MEDICARE

## 2024-04-10 DIAGNOSIS — Z95.0 PRESENCE OF CARDIAC PACEMAKER: ICD-10-CM

## 2024-04-10 DIAGNOSIS — I44.2 COMPLETE HEART BLOCK (MULTI): ICD-10-CM

## 2024-04-10 PROCEDURE — 93296 REM INTERROG EVL PM/IDS: CPT

## 2024-04-10 PROCEDURE — 93294 REM INTERROG EVL PM/LDLS PM: CPT | Performed by: INTERNAL MEDICINE

## 2024-04-16 ENCOUNTER — HOSPITAL ENCOUNTER (OUTPATIENT)
Facility: HOSPITAL | Age: 69
Setting detail: OUTPATIENT SURGERY
Discharge: HOME | End: 2024-04-16
Attending: ORTHOPAEDIC SURGERY | Admitting: ORTHOPAEDIC SURGERY
Payer: MEDICARE

## 2024-04-16 VITALS
OXYGEN SATURATION: 94 % | HEART RATE: 71 BPM | DIASTOLIC BLOOD PRESSURE: 79 MMHG | RESPIRATION RATE: 18 BRPM | SYSTOLIC BLOOD PRESSURE: 137 MMHG | TEMPERATURE: 98.1 F

## 2024-04-16 DIAGNOSIS — M65.322 TRIGGER INDEX FINGER OF LEFT HAND: Primary | ICD-10-CM

## 2024-04-16 PROCEDURE — 7100000009 HC PHASE TWO TIME - INITIAL BASE CHARGE: Performed by: ORTHOPAEDIC SURGERY

## 2024-04-16 PROCEDURE — 2500000005 HC RX 250 GENERAL PHARMACY W/O HCPCS: Performed by: ORTHOPAEDIC SURGERY

## 2024-04-16 PROCEDURE — 3600000008 HC OR TIME - EACH INCREMENTAL 1 MINUTE - PROCEDURE LEVEL THREE: Performed by: ORTHOPAEDIC SURGERY

## 2024-04-16 PROCEDURE — 7100000010 HC PHASE TWO TIME - EACH INCREMENTAL 1 MINUTE: Performed by: ORTHOPAEDIC SURGERY

## 2024-04-16 PROCEDURE — 2720000007 HC OR 272 NO HCPCS: Performed by: ORTHOPAEDIC SURGERY

## 2024-04-16 PROCEDURE — 3600000003 HC OR TIME - INITIAL BASE CHARGE - PROCEDURE LEVEL THREE: Performed by: ORTHOPAEDIC SURGERY

## 2024-04-16 PROCEDURE — 26055 INCISE FINGER TENDON SHEATH: CPT | Performed by: ORTHOPAEDIC SURGERY

## 2024-04-16 RX ORDER — FLUTICASONE PROPIONATE AND SALMETEROL 500; 50 UG/1; UG/1
1 POWDER RESPIRATORY (INHALATION)
COMMUNITY

## 2024-04-16 RX ORDER — BUPIVACAINE HYDROCHLORIDE 5 MG/ML
INJECTION, SOLUTION PERINEURAL AS NEEDED
Status: DISCONTINUED | OUTPATIENT
Start: 2024-04-16 | End: 2024-04-16 | Stop reason: HOSPADM

## 2024-04-16 ASSESSMENT — PAIN - FUNCTIONAL ASSESSMENT
PAIN_FUNCTIONAL_ASSESSMENT: 0-10
PAIN_FUNCTIONAL_ASSESSMENT: 0-10

## 2024-04-16 ASSESSMENT — COLUMBIA-SUICIDE SEVERITY RATING SCALE - C-SSRS
6. HAVE YOU EVER DONE ANYTHING, STARTED TO DO ANYTHING, OR PREPARED TO DO ANYTHING TO END YOUR LIFE?: NO
2. HAVE YOU ACTUALLY HAD ANY THOUGHTS OF KILLING YOURSELF?: NO
1. IN THE PAST MONTH, HAVE YOU WISHED YOU WERE DEAD OR WISHED YOU COULD GO TO SLEEP AND NOT WAKE UP?: NO

## 2024-04-16 ASSESSMENT — PAIN SCALES - GENERAL
PAINLEVEL_OUTOF10: 0 - NO PAIN
PAINLEVEL_OUTOF10: 0 - NO PAIN

## 2024-04-16 NOTE — OP NOTE
LEFT INDEX FINGER A-1 PULLEY RELEASE (L) Operative Note     Date: 2024  OR Location: [facility] OR    Name: Seble Reynolds, : 1955, Age: 68 y.o., MRN: [id number], Sex: female    Diagnosis  Pre-op Diagnosis     * Trigger index finger of left hand [M65.322] Post-op Diagnosis     * Trigger index finger of left hand [M65.322]     Procedures  LEFT INDEX FINGER A-1 PULLEY RELEASE  72220 - CA TENDON SHEATH INCISION      Surgeons      * Chapin Dejesus - Primary    Resident/Fellow/Other Assistant:  Surgeons and Role:  * No surgeons found with a matching role *    Procedure Summary  Anesthesia: Local  ASA: ASA status not filed in the log.  Anesthesia Staff: Anesthesiologist: Sukumar Wilson MD  Estimated Blood Loss: 1 mL  Intra-op Medications: Administrations occurring from 1100 to 1145 on 24:  * No intraprocedure medications in log *      Intraprocedure I/O Totals       None           Specimen: No specimens collected     Staff:   Circulator: Angeline Posey RN; Meryl Goff RN  Scrub Person: Ngoc Hall         Drains and/or Catheters: * None in log *    Tourniquet Times:     Total Tourniquet Time Documented:  Arm - Lower (Left) - 6 minutes  Total: Arm - Lower (Left) - 6 minutes      Implants:     Findings: Thickened A1 pulley    Indications: Seble Reynolds is an 68 y.o. female who is having surgery for Trigger index finger of left hand [M65.322].  Patient presented with left index finger pain and locking secondary to a trigger digit.  She had tried and failed conservative management.  We then discussed surgical treatment options including a left index finger A1 pulley release.  I explained to the patient the risk, benefits and alternatives of this procedure.  I explained to the patient that the risks of the procedure include but are not limited to infection, damage to nerve tendon or blood vessels, postoperative pain and stiffness, as well as risks associated with anesthesia.  The patient voiced  understanding and informed consent was obtained.    The patient was seen in the preoperative area. The risks, benefits, complications, treatment options, non-operative alternatives, expected recovery and outcomes were discussed with the patient. The possibilities of reaction to medication, pulmonary aspiration, injury to surrounding structures, bleeding, recurrent infection, the need for additional procedures, failure to diagnose a condition, and creating a complication requiring transfusion or operation were discussed with the patient. The patient concurred with the proposed plan, giving informed consent.  The site of surgery was properly noted/marked if necessary per policy. The patient has been actively warmed in preoperative area. Preoperative antibiotics are not indicated. Venous thrombosis prophylaxis are not indicated.    Procedure Details: The patient was prepped identified in the preoperative waiting area and her left index finger was marked as site of surgery.  Patient was taken back to the operating room suite placed supine on the OR table.  A nonsterile tourniquet was applied to the patient's left forearm.  A preoperative verification timeout was taken.  Under clean conditions I then injected 8 mL of half percent plain Marcaine in line with my proposed incision site.  Patient's left upper extremity was prepped and draped in the usual sterile fashion.  Patient's left upper extremity was exsanguinated with an Esmarch bandage and the tourniquet inflated to 250 mmHg.  Approximately 1.5 cm incision was made over the A1 pulley to the index finger.  Sharp dissection was carried through skin and subcutaneous tissue.  Electrocautery was used to achieve hemostasis.  I dissected down to the level of the A1 pulley and sharply transected this.  Wound was irrigated with normal saline.  Skin was closed with 4-0 nylon suture.  Tourniquet was let down after 6 minutes.  Good vascular return was seen the patient's left  hand and all digits.  Sterile dressing consisting of Xeroform, gauze 4 x 4's, Webril and Ace wrap was applied to patient's left hand.  Patient was brought back to recovery room in stable condition.  There were no complications in the case.  All sponge and needle counts were correct at the end of the case.  Complications:  None; patient tolerated the procedure well.    Disposition: PACU - hemodynamically stable.  Condition: stable         Additional Details: None    Attending Attestation: I performed the procedure.    Chapin Dejesus  Phone Number: [phone]       Home

## 2024-04-16 NOTE — BRIEF OP NOTE
Date: 2024  OR Location: [facility] OR    Name: Seble Reynolds, : 1955, Age: 68 y.o., MRN: [id number], Sex: female    Diagnosis  Pre-op Diagnosis     * Trigger index finger of left hand [M65.322] Post-op Diagnosis     * Trigger index finger of left hand [M65.322]     Procedures  LEFT INDEX FINGER A-1 PULLEY RELEASE  80725 - [place] TENDON SHEATH INCISION      Surgeons      * Chapin Dejesus - Primary    Resident/Fellow/Other Assistant:  Surgeons and Role:  * No surgeons found with a matching role *    Procedure Summary  Anesthesia: Local  ASA: ASA status not filed in the log.  Anesthesia Staff: Anesthesiologist: Sukumar Wilson MD  Estimated Blood Loss: 1 mL  Intra-op Medications: Administrations occurring from 1100 to 1145 on 24:  * No intraprocedure medications in log *      Intraprocedure I/O Totals       None           Specimen: No specimens collected     Staff:   Circulator: Angeline Posey RN; Meryl Goff RN  Scrub Person: Ngoc Hall          Findings: Thickened A1 pulley    Complications:  None; patient tolerated the procedure well.     Disposition: PACU - hemodynamically stable.  Condition: stable  Specimens Collected: No specimens collected  Attending Attestation: I performed the procedure.    Chapin Dejesus  Phone Number: [phone]

## 2024-04-17 ASSESSMENT — PAIN SCALES - GENERAL: PAINLEVEL_OUTOF10: 0 - NO PAIN

## 2024-04-29 ENCOUNTER — OFFICE VISIT (OUTPATIENT)
Dept: ORTHOPEDIC SURGERY | Facility: CLINIC | Age: 69
End: 2024-04-29
Payer: MEDICARE

## 2024-04-29 DIAGNOSIS — M65.322 TRIGGER INDEX FINGER OF LEFT HAND: Primary | ICD-10-CM

## 2024-04-29 PROCEDURE — 3049F LDL-C 100-129 MG/DL: CPT | Performed by: ORTHOPAEDIC SURGERY

## 2024-04-29 PROCEDURE — 3062F POS MACROALBUMINURIA REV: CPT | Performed by: ORTHOPAEDIC SURGERY

## 2024-04-29 PROCEDURE — 99024 POSTOP FOLLOW-UP VISIT: CPT | Performed by: ORTHOPAEDIC SURGERY

## 2024-04-29 PROCEDURE — 3044F HG A1C LEVEL LT 7.0%: CPT | Performed by: ORTHOPAEDIC SURGERY

## 2024-04-29 PROCEDURE — 1160F RVW MEDS BY RX/DR IN RCRD: CPT | Performed by: ORTHOPAEDIC SURGERY

## 2024-04-29 PROCEDURE — 1159F MED LIST DOCD IN RCRD: CPT | Performed by: ORTHOPAEDIC SURGERY

## 2024-04-29 NOTE — PROGRESS NOTES
Subjective    Patient ID: Seble Reynolds is a 68 y.o. female.    Chief Complaint: OTHER (POSTOP CHECK LEFT INDEX FINGER A-1 PULLEY RELEASE/DOS 4/16/24)     Last Surgery: Left Index Finger A-1 Pulley Release - Left  Last Surgery Date: 4/16/2024    HPI  Patient comes in for her postoperative visit after undergoing a left index finger trigger release.  She has had no complaints of pain since surgery.  She has been using the left hand without difficulty.  Objective   Ortho Exam  Patient is in no acute distress.  Exam of her left hand reveals her incision has healed well.  There is no evidence of infection.  Sutures were removed.  She has full range of motion in her left index finger.  There was no locking noted.    Image Results:  XR hand left 3+ views  Narrative: Interpreted By:  Eliot Prasad,   STUDY:  XR HAND LEFT 3+ VIEWS;  2/29/2024 9:23 am      INDICATION:  Signs/Symptoms:Pain at 2nd MCP joint.      COMPARISON:  None.      ACCESSION NUMBER(S):  [id number]      ORDERING CLINICIAN:  LULÚ JOSE      TECHNIQUE:  3 views  of the  left hand were obtained.      FINDINGS:  No significant osteophytic change.  No lytic or blastic destructive bone lesion.  Trace spur formation in the 2nd through 5th DIP joints and the 2nd  PIP joint. There are tiny ligament calcifications along the proximal  medial and lateral corners of the 2nd proximal phalanx at the 2nd MCP  joint. No opaque soft tissue foreign body.  No periosteal reaction or erosion.      Impression: No acute fracture or dislocation.      Trace DJD in the 2nd PIP joint and the 2nd through 5th DIP joints as  described.      Tiny nonspecific ligament calcifications along the proximal medial  and lateral corners of the 2nd proximal phalanx. This could be due to  CPPD or HADD.      MACRO:  None      Signed by: Eliot Prasad 3/1/2024 8:35 AM  Dictation workstation:   [id number]      Assessment/Plan   Encounter Diagnoses:  Trigger index finger of left hand    Patient is  doing satisfactory following her left index finger trigger release.  She may use her left hand is much as she can tolerate.  She will follow-up as her symptoms dictate.

## 2024-05-06 ENCOUNTER — TELEPHONE (OUTPATIENT)
Dept: ORTHOPEDIC SURGERY | Facility: CLINIC | Age: 69
End: 2024-05-06
Payer: MEDICARE

## 2024-05-06 DIAGNOSIS — M65.322 TRIGGER INDEX FINGER OF LEFT HAND: Primary | ICD-10-CM

## 2024-05-06 RX ORDER — SULFAMETHOXAZOLE AND TRIMETHOPRIM 800; 160 MG/1; MG/1
1 TABLET ORAL 2 TIMES DAILY
Qty: 20 TABLET | Refills: 0 | Status: SHIPPED | OUTPATIENT
Start: 2024-05-06 | End: 2024-05-16

## 2024-05-06 NOTE — TELEPHONE ENCOUNTER
Patient calling regarding her LT index finger. States that it is still swollen, red and very painful. Wondering if she should be concerned.

## 2024-05-16 ENCOUNTER — TELEPHONE (OUTPATIENT)
Dept: ORTHOPEDIC SURGERY | Facility: CLINIC | Age: 69
End: 2024-05-16
Payer: MEDICARE

## 2024-05-16 NOTE — TELEPHONE ENCOUNTER
Patient calling in regarding her pointer finger again, states she stopped taking the antibiotic yesterday because she had red bumps all over her. States the finger is still swollen and red. Would like to know what to do.

## 2024-05-20 ENCOUNTER — OFFICE VISIT (OUTPATIENT)
Dept: ORTHOPEDIC SURGERY | Facility: CLINIC | Age: 69
End: 2024-05-20
Payer: MEDICARE

## 2024-05-20 VITALS — HEIGHT: 62 IN | WEIGHT: 138 LBS | BODY MASS INDEX: 25.4 KG/M2

## 2024-05-20 DIAGNOSIS — M65.322 TRIGGER INDEX FINGER OF LEFT HAND: Primary | ICD-10-CM

## 2024-05-20 PROCEDURE — 3044F HG A1C LEVEL LT 7.0%: CPT | Performed by: ORTHOPAEDIC SURGERY

## 2024-05-20 PROCEDURE — 99024 POSTOP FOLLOW-UP VISIT: CPT | Performed by: ORTHOPAEDIC SURGERY

## 2024-05-20 PROCEDURE — 1160F RVW MEDS BY RX/DR IN RCRD: CPT | Performed by: ORTHOPAEDIC SURGERY

## 2024-05-20 PROCEDURE — 3049F LDL-C 100-129 MG/DL: CPT | Performed by: ORTHOPAEDIC SURGERY

## 2024-05-20 PROCEDURE — 3062F POS MACROALBUMINURIA REV: CPT | Performed by: ORTHOPAEDIC SURGERY

## 2024-05-20 PROCEDURE — 1036F TOBACCO NON-USER: CPT | Performed by: ORTHOPAEDIC SURGERY

## 2024-05-20 PROCEDURE — 1159F MED LIST DOCD IN RCRD: CPT | Performed by: ORTHOPAEDIC SURGERY

## 2024-05-20 RX ORDER — METHYLPREDNISOLONE 4 MG/1
4 TABLET ORAL ONCE
Qty: 21 TABLET | Refills: 0 | Status: SHIPPED | OUTPATIENT
Start: 2024-05-20 | End: 2024-05-20

## 2024-05-20 NOTE — PROGRESS NOTES
Subjective    Patient ID: Seble Reynolds is a 68 y.o. female.    Chief Complaint: Follow-up of the Left Index Finger (LEFT INDEX FINGER A-1 PULLEY RELEASE/DOS 4/16/24)     Last Surgery: Left Index Finger A-1 Pulley Release - Left  Last Surgery Date: 4/16/2024    HPI  Patient comes in for follow-up of her left index finger trigger release.  Her surgery was about 1 month ago.  She was concerned because of continued swelling.    Objective   Ortho Exam  Patient is in no acute distress.  Exam of her left hand reveals her incision is well-healed.  There is mild swelling more ulnar to the incision and radial.  There is no evidence of infection.  She has full range of motion in her index and middle finger.  There was no locking noted on exam.    Assessment/Plan   Encounter Diagnoses:  Trigger index finger of left hand    Orders Placed This Encounter    methylPREDNISolone (Medrol Dospak) 4 mg tablets     Patient continues to have more swelling than usual following her left index finger trigger release.  I will prescribe the patient a Medrol Dosepak.  She was informed that she needs to watch her blood sugars as she is on this.  She otherwise will follow-up as her symptoms dictate.

## 2024-06-18 DIAGNOSIS — I47.11 INAPPROPRIATE SINUS TACHYCARDIA (CMS-HCC): ICD-10-CM

## 2024-06-18 NOTE — TELEPHONE ENCOUNTER
Pt needs hard copy of Corlanor script, she will be getting thru a [place] pharmacy. Pended to Dr Ramicone.

## 2024-06-25 DIAGNOSIS — E11.9 TYPE 2 DIABETES MELLITUS WITHOUT COMPLICATION, WITHOUT LONG-TERM CURRENT USE OF INSULIN (MULTI): ICD-10-CM

## 2024-06-25 DIAGNOSIS — K21.9 GASTROESOPHAGEAL REFLUX DISEASE WITHOUT ESOPHAGITIS: Primary | ICD-10-CM

## 2024-06-25 RX ORDER — OMEPRAZOLE 20 MG/1
20 CAPSULE, DELAYED RELEASE ORAL DAILY
Qty: 90 CAPSULE | Refills: 3 | Status: SHIPPED | OUTPATIENT
Start: 2024-06-25 | End: 2025-06-25

## 2024-06-25 RX ORDER — LOVASTATIN 40 MG/1
40 TABLET ORAL EVERY EVENING
Qty: 90 TABLET | Refills: 3 | Status: SHIPPED | OUTPATIENT
Start: 2024-06-25 | End: 2025-06-25

## 2024-06-25 NOTE — TELEPHONE ENCOUNTER
Rx Refill Request Telephone Encounter    Name:  Seble Reynolds  :  [id number]  Medication Name:  Lovastatin 40 mg tablet   Dose : 40 mg  Route : oral  Frequency : every evening   Quantity :    Directions : Take 1 tablet (40 mg) by mouth once daily in the evening.  Rx Refill Request Telephone Encounter    Name:  Seble Reynolds  :  [id number]  Medication Name:  Omeprazole 20 mg capsule   Dose : 20 mg  Route : oral  Frequency : daily  Quantity :    Directions : Take 1 capsule (20 mg) by mouth once daily.  Specific Pharmacy location:  [facility]

## 2024-07-23 ENCOUNTER — HOSPITAL ENCOUNTER (OUTPATIENT)
Dept: CARDIOLOGY | Facility: CLINIC | Age: 69
Discharge: HOME | End: 2024-07-23
Payer: MEDICARE

## 2024-07-23 DIAGNOSIS — I44.2 COMPLETE HEART BLOCK (MULTI): ICD-10-CM

## 2024-07-23 PROCEDURE — 93306 TTE W/DOPPLER COMPLETE: CPT | Performed by: INTERNAL MEDICINE

## 2024-07-23 PROCEDURE — 93306 TTE W/DOPPLER COMPLETE: CPT

## 2024-07-23 PROCEDURE — 2500000004 HC RX 250 GENERAL PHARMACY W/ HCPCS (ALT 636 FOR OP/ED): Performed by: INTERNAL MEDICINE

## 2024-07-24 ENCOUNTER — APPOINTMENT (OUTPATIENT)
Dept: ORTHOPEDIC SURGERY | Facility: CLINIC | Age: 69
End: 2024-07-24
Payer: MEDICARE

## 2024-07-24 DIAGNOSIS — G56.02 CARPAL TUNNEL SYNDROME ON LEFT: Primary | ICD-10-CM

## 2024-07-24 LAB
AORTIC VALVE MEAN GRADIENT: 4.5 MMHG
AORTIC VALVE PEAK VELOCITY: 1.49 M/S
AV PEAK GRADIENT: 8.8 MMHG
EJECTION FRACTION APICAL 4 CHAMBER: 61.5
EJECTION FRACTION: 53 %
LEFT VENTRICLE INTERNAL DIMENSION DIASTOLE: 4.51 CM (ref 3.5–6)
LEFT VENTRICULAR OUTFLOW TRACT DIAMETER: 1.98 CM
MITRAL VALVE E/A RATIO: 0.93
RIGHT VENTRICLE FREE WALL PEAK S': 0.1 CM/S
RIGHT VENTRICLE PEAK SYSTOLIC PRESSURE: 29.1 MMHG
TRICUSPID ANNULAR PLANE SYSTOLIC EXCURSION: 2.2 CM

## 2024-07-24 PROCEDURE — 99213 OFFICE O/P EST LOW 20 MIN: CPT | Performed by: ORTHOPAEDIC SURGERY

## 2024-07-24 PROCEDURE — 3049F LDL-C 100-129 MG/DL: CPT | Performed by: ORTHOPAEDIC SURGERY

## 2024-07-24 PROCEDURE — 3062F POS MACROALBUMINURIA REV: CPT | Performed by: ORTHOPAEDIC SURGERY

## 2024-07-24 PROCEDURE — 3044F HG A1C LEVEL LT 7.0%: CPT | Performed by: ORTHOPAEDIC SURGERY

## 2024-08-05 ENCOUNTER — APPOINTMENT (OUTPATIENT)
Dept: PRIMARY CARE | Facility: CLINIC | Age: 69
End: 2024-08-05
Payer: MEDICARE

## 2024-08-16 DIAGNOSIS — E11.9 TYPE 2 DIABETES MELLITUS WITHOUT COMPLICATION, WITHOUT LONG-TERM CURRENT USE OF INSULIN (MULTI): ICD-10-CM

## 2024-08-16 RX ORDER — METFORMIN HYDROCHLORIDE 500 MG/1
TABLET, EXTENDED RELEASE ORAL
Qty: 180 TABLET | Refills: 1 | Status: SHIPPED | OUTPATIENT
Start: 2024-08-16

## 2024-08-19 ENCOUNTER — TELEPHONE (OUTPATIENT)
Dept: ORTHOPEDIC SURGERY | Facility: CLINIC | Age: 69
End: 2024-08-19
Payer: MEDICARE

## 2024-10-18 ENCOUNTER — APPOINTMENT (OUTPATIENT)
Dept: PRIMARY CARE | Facility: CLINIC | Age: 69
End: 2024-10-18
Payer: MEDICARE

## 2024-10-18 ENCOUNTER — PATIENT MESSAGE (OUTPATIENT)
Dept: PRIMARY CARE | Facility: CLINIC | Age: 69
End: 2024-10-18

## 2024-10-18 VITALS
OXYGEN SATURATION: 95 % | HEIGHT: 62 IN | BODY MASS INDEX: 28.08 KG/M2 | WEIGHT: 152.6 LBS | DIASTOLIC BLOOD PRESSURE: 69 MMHG | SYSTOLIC BLOOD PRESSURE: 113 MMHG | HEART RATE: 63 BPM

## 2024-10-18 DIAGNOSIS — M79.641 PAIN IN BOTH HANDS: ICD-10-CM

## 2024-10-18 DIAGNOSIS — R53.83 OTHER FATIGUE: ICD-10-CM

## 2024-10-18 DIAGNOSIS — Z12.31 ENCOUNTER FOR SCREENING MAMMOGRAM FOR MALIGNANT NEOPLASM OF BREAST: ICD-10-CM

## 2024-10-18 DIAGNOSIS — Z00.00 ROUTINE GENERAL MEDICAL EXAMINATION AT HEALTH CARE FACILITY: Primary | ICD-10-CM

## 2024-10-18 DIAGNOSIS — R19.8 CHANGE IN BOWEL FUNCTION: ICD-10-CM

## 2024-10-18 DIAGNOSIS — Z00.00 MEDICARE ANNUAL WELLNESS VISIT, SUBSEQUENT: ICD-10-CM

## 2024-10-18 DIAGNOSIS — K21.9 GASTROESOPHAGEAL REFLUX DISEASE WITHOUT ESOPHAGITIS: ICD-10-CM

## 2024-10-18 DIAGNOSIS — Z23 NEED FOR VACCINATION AGAINST STREPTOCOCCUS PNEUMONIAE: ICD-10-CM

## 2024-10-18 DIAGNOSIS — J84.10 PULMONARY FIBROSIS, UNSPECIFIED (MULTI): ICD-10-CM

## 2024-10-18 DIAGNOSIS — E11.9 TYPE 2 DIABETES MELLITUS WITHOUT COMPLICATION, WITHOUT LONG-TERM CURRENT USE OF INSULIN (MULTI): ICD-10-CM

## 2024-10-18 DIAGNOSIS — M79.642 PAIN IN BOTH HANDS: ICD-10-CM

## 2024-10-18 PROBLEM — J96.00 ACUTE RESPIRATORY FAILURE, UNSP W HYPOXIA OR HYPERCAPNIA (MULTI): Status: RESOLVED | Noted: 2023-09-03 | Resolved: 2024-10-18

## 2024-10-18 PROBLEM — M65.322 TRIGGER INDEX FINGER OF LEFT HAND: Status: RESOLVED | Noted: 2024-03-25 | Resolved: 2024-10-18

## 2024-10-18 PROCEDURE — 1159F MED LIST DOCD IN RCRD: CPT | Performed by: INTERNAL MEDICINE

## 2024-10-18 PROCEDURE — 3044F HG A1C LEVEL LT 7.0%: CPT | Performed by: INTERNAL MEDICINE

## 2024-10-18 PROCEDURE — 3062F POS MACROALBUMINURIA REV: CPT | Performed by: INTERNAL MEDICINE

## 2024-10-18 PROCEDURE — G0009 ADMIN PNEUMOCOCCAL VACCINE: HCPCS | Performed by: INTERNAL MEDICINE

## 2024-10-18 PROCEDURE — 99214 OFFICE O/P EST MOD 30 MIN: CPT | Performed by: INTERNAL MEDICINE

## 2024-10-18 PROCEDURE — 1170F FXNL STATUS ASSESSED: CPT | Performed by: INTERNAL MEDICINE

## 2024-10-18 PROCEDURE — 3008F BODY MASS INDEX DOCD: CPT | Performed by: INTERNAL MEDICINE

## 2024-10-18 PROCEDURE — 1123F ACP DISCUSS/DSCN MKR DOCD: CPT | Performed by: INTERNAL MEDICINE

## 2024-10-18 PROCEDURE — G0439 PPPS, SUBSEQ VISIT: HCPCS | Performed by: INTERNAL MEDICINE

## 2024-10-18 PROCEDURE — 90677 PCV20 VACCINE IM: CPT | Performed by: INTERNAL MEDICINE

## 2024-10-18 PROCEDURE — 1036F TOBACCO NON-USER: CPT | Performed by: INTERNAL MEDICINE

## 2024-10-18 PROCEDURE — 1158F ADVNC CARE PLAN TLK DOCD: CPT | Performed by: INTERNAL MEDICINE

## 2024-10-18 PROCEDURE — 3074F SYST BP LT 130 MM HG: CPT | Performed by: INTERNAL MEDICINE

## 2024-10-18 PROCEDURE — 3049F LDL-C 100-129 MG/DL: CPT | Performed by: INTERNAL MEDICINE

## 2024-10-18 PROCEDURE — 3078F DIAST BP <80 MM HG: CPT | Performed by: INTERNAL MEDICINE

## 2024-10-18 RX ORDER — DAPAGLIFLOZIN 5 MG/1
5 TABLET, FILM COATED ORAL DAILY
Qty: 100 TABLET | Refills: 3 | Status: SHIPPED | OUTPATIENT
Start: 2024-10-18 | End: 2025-11-22

## 2024-10-18 RX ORDER — GABAPENTIN 100 MG/1
1 CAPSULE ORAL
COMMUNITY
Start: 2024-10-09

## 2024-10-18 RX ORDER — LOVASTATIN 40 MG/1
TABLET ORAL
Qty: 90 TABLET | Refills: 3 | Status: SHIPPED | OUTPATIENT
Start: 2024-10-18

## 2024-10-18 RX ORDER — RESPIRATORY SYNCYTIAL VISUS VACCINE RECOMBINANT, ADJUVANTED 120MCG/0.5
KIT INTRAMUSCULAR
COMMUNITY
Start: 2024-01-26

## 2024-10-18 ASSESSMENT — ACTIVITIES OF DAILY LIVING (ADL)
DRESSING: INDEPENDENT
TAKING_MEDICATION: INDEPENDENT
DOING_HOUSEWORK: INDEPENDENT
MANAGING_FINANCES: INDEPENDENT
GROCERY_SHOPPING: INDEPENDENT
BATHING: INDEPENDENT

## 2024-10-18 ASSESSMENT — PATIENT HEALTH QUESTIONNAIRE - PHQ9
2. FEELING DOWN, DEPRESSED OR HOPELESS: NOT AT ALL
SUM OF ALL RESPONSES TO PHQ9 QUESTIONS 1 AND 2: 0
1. LITTLE INTEREST OR PLEASURE IN DOING THINGS: NOT AT ALL

## 2024-10-18 NOTE — ASSESSMENT & PLAN NOTE
Orders:    lovastatin (Mevacor) 40 mg tablet; 1 tablet every 3rd day    dapagliflozin propanediol (Farxiga) 5 mg; Take 1 tablet (5 mg) by mouth once daily.    CBC; Future    Comprehensive Metabolic Panel; Future    Lipid Panel; Future    Hemoglobin A1C; Future

## 2024-10-18 NOTE — PROGRESS NOTES
"Subjective   Reason for Visit: Seble Reynolds is an 68 y.o. female here for a Medicare Wellness visit and follow up.     Past Medical, Surgical, and Family History reviewed and updated in chart.    Reviewed all medications by prescribing practitioner or clinical pharmacist (such as prescriptions, OTCs, herbal therapies and supplements) and documented in the medical record.    HPI  Still dealing with hand pain and cramping. Had trigger finger surgery, still having pain and swelling in both hands. Saw Rheumatology, Dr Petit and he started her on gabapentin but no real explanation to a cause. Had EMG.  Started gabapentin last week, she is feeling better on it.  Stopping and then decreasing statin didn't minimize the symptoms.    Switching to ER metformin didn't help much with her Bms. Has loose Bms, 'explosions.'.  This has been going on since starting regular metformin.  Weight is up from last visit  Reports watching sugars and carbs in the diet.    Heartburn controlled on PPI. She has tried tapering off in the past but gets heartburn coming back. Denies side effects with the PPI.    Patient Care Team:  Mike Masterson MD as PCP - General (Internal Medicine)  Cesar Cohen MD as PCP - [employer] PCP  James C Ramicone, DO as Consulting Physician (Cardiology)     Review of Systems    Objective   Vitals:  /69 (BP Location: Right arm, Patient Position: Sitting)   Pulse 63   Ht 1.575 m (5' 2\")   Wt 69.2 kg (152 lb 9.6 oz)   SpO2 95%   BMI 27.91 kg/m²       Physical Exam  Constitutional:       Appearance: Normal appearance.   HENT:      Mouth/Throat:      Mouth: Mucous membranes are moist.   Eyes:      Extraocular Movements: Extraocular movements intact.      Pupils: Pupils are equal, round, and reactive to light.   Cardiovascular:      Rate and Rhythm: Normal rate and regular rhythm.      Heart sounds: No murmur heard.  Pulmonary:      Effort: Pulmonary effort is normal.      Breath sounds: " Normal breath sounds.   Musculoskeletal:      Cervical back: Neck supple.      Right lower leg: No edema.      Left lower leg: No edema.      Comments: DP pulses 2+, no foot skin breakdown   Neurological:      Mental Status: She is alert.      Cranial Nerves: No cranial nerve deficit.      Motor: No weakness.      Gait: Gait normal.         Assessment & Plan  Routine general medical examination at health care facility    Orders:    1 Year Follow Up In Primary Care - Wellness Exam; Future    Medicare annual wellness visit, subsequent         Type 2 diabetes mellitus without complication, without long-term current use of insulin (Multi)    Orders:    lovastatin (Mevacor) 40 mg tablet; 1 tablet every 3rd day    dapagliflozin propanediol (Farxiga) 5 mg; Take 1 tablet (5 mg) by mouth once daily.    CBC; Future    Comprehensive Metabolic Panel; Future    Lipid Panel; Future    Hemoglobin A1C; Future    Pain in both hands    Orders:    TSH with reflex to Free T4 if abnormal; Future    Pulmonary fibrosis, unspecified (Multi)         Gastroesophageal reflux disease without esophagitis    Orders:    Referral to Gastroenterology; Future    Change in bowel function    Orders:    Referral to Gastroenterology; Future    Need for vaccination against Streptococcus pneumoniae    Orders:    Pneumococcal conjugate vaccine, 20-valent (PREVNAR 20)    Other fatigue    Orders:    TSH with reflex to Free T4 if abnormal; Future    Encounter for screening mammogram for malignant neoplasm of breast    Orders:    BI mammo bilateral screening tomosynthesis; Future                DM  -Check A1C  -Stop ER metformin due to loose BMs. Substitute with farxiga.   -Sees Opth yearly     Sarcoid - Sees Dr Aquino    GERD - continue PPI, never had EGD, referred to GI     CHB s/p PPM - Sees Dr Ramicone     Asthma - Sees Pulm     Sarcoidosis  - Sees Dr Aquino     HLP - continue lovastatin every third day     Hand cramps/pain - Seeing Rheumatology    Chronic  bowel irregularities - referred to GI. Stop metformin in the mean time.     HM:  -Cologuard neg 7/2023  -Mamm - nml 8/2023  -Shingrix - UTD  -Prevnar 20 today     Follow up 6 months

## 2024-10-21 ENCOUNTER — PATIENT MESSAGE (OUTPATIENT)
Dept: PRIMARY CARE | Facility: CLINIC | Age: 69
End: 2024-10-21
Payer: MEDICARE

## 2024-10-21 DIAGNOSIS — E11.9 TYPE 2 DIABETES MELLITUS WITHOUT COMPLICATION, WITHOUT LONG-TERM CURRENT USE OF INSULIN (MULTI): ICD-10-CM

## 2024-10-22 ENCOUNTER — APPOINTMENT (OUTPATIENT)
Dept: CARDIOLOGY | Facility: CLINIC | Age: 69
End: 2024-10-22
Payer: MEDICARE

## 2024-10-25 RX ORDER — DAPAGLIFLOZIN 5 MG/1
5 TABLET, FILM COATED ORAL DAILY
Qty: 100 TABLET | Refills: 3 | Status: SHIPPED | OUTPATIENT
Start: 2024-10-25 | End: 2025-11-29

## 2024-10-29 ENCOUNTER — PATIENT MESSAGE (OUTPATIENT)
Dept: PRIMARY CARE | Facility: CLINIC | Age: 69
End: 2024-10-29
Payer: MEDICARE

## 2024-11-15 ENCOUNTER — LAB (OUTPATIENT)
Dept: LAB | Facility: LAB | Age: 69
End: 2024-11-15
Payer: MEDICARE

## 2024-11-15 ENCOUNTER — HOSPITAL ENCOUNTER (OUTPATIENT)
Dept: RADIOLOGY | Facility: CLINIC | Age: 69
Discharge: HOME | End: 2024-11-15
Payer: MEDICARE

## 2024-11-15 VITALS — BODY MASS INDEX: 25.76 KG/M2 | HEIGHT: 62 IN | WEIGHT: 140 LBS

## 2024-11-15 DIAGNOSIS — E11.9 TYPE 2 DIABETES MELLITUS WITHOUT COMPLICATION, WITHOUT LONG-TERM CURRENT USE OF INSULIN (MULTI): ICD-10-CM

## 2024-11-15 DIAGNOSIS — Z12.31 ENCOUNTER FOR SCREENING MAMMOGRAM FOR MALIGNANT NEOPLASM OF BREAST: ICD-10-CM

## 2024-11-15 DIAGNOSIS — M79.641 PAIN IN BOTH HANDS: ICD-10-CM

## 2024-11-15 DIAGNOSIS — M79.642 PAIN IN BOTH HANDS: ICD-10-CM

## 2024-11-15 DIAGNOSIS — R53.83 OTHER FATIGUE: ICD-10-CM

## 2024-11-15 LAB
ALBUMIN SERPL BCP-MCNC: 4 G/DL (ref 3.4–5)
ALP SERPL-CCNC: 149 U/L (ref 33–136)
ALT SERPL W P-5'-P-CCNC: 19 U/L (ref 7–45)
ANION GAP SERPL CALC-SCNC: 13 MMOL/L (ref 10–20)
AST SERPL W P-5'-P-CCNC: 19 U/L (ref 9–39)
BILIRUB SERPL-MCNC: 0.8 MG/DL (ref 0–1.2)
BUN SERPL-MCNC: 14 MG/DL (ref 6–23)
CALCIUM SERPL-MCNC: 9.5 MG/DL (ref 8.6–10.3)
CHLORIDE SERPL-SCNC: 104 MMOL/L (ref 98–107)
CHOLEST SERPL-MCNC: 180 MG/DL (ref 0–199)
CHOLESTEROL/HDL RATIO: 2.6
CO2 SERPL-SCNC: 27 MMOL/L (ref 21–32)
CREAT SERPL-MCNC: 0.81 MG/DL (ref 0.5–1.05)
EGFRCR SERPLBLD CKD-EPI 2021: 79 ML/MIN/1.73M*2
ERYTHROCYTE [DISTWIDTH] IN BLOOD BY AUTOMATED COUNT: 14.1 % (ref 11.5–14.5)
EST. AVERAGE GLUCOSE BLD GHB EST-MCNC: 140 MG/DL
GLUCOSE SERPL-MCNC: 123 MG/DL (ref 74–99)
HBA1C MFR BLD: 6.5 %
HCT VFR BLD AUTO: 42.3 % (ref 36–46)
HDLC SERPL-MCNC: 70.1 MG/DL
HGB BLD-MCNC: 13.7 G/DL (ref 12–16)
LDLC SERPL CALC-MCNC: 96 MG/DL
MCH RBC QN AUTO: 28.8 PG (ref 26–34)
MCHC RBC AUTO-ENTMCNC: 32.4 G/DL (ref 32–36)
MCV RBC AUTO: 89 FL (ref 80–100)
NON HDL CHOLESTEROL: 110 MG/DL (ref 0–149)
NRBC BLD-RTO: 0 /100 WBCS (ref 0–0)
PLATELET # BLD AUTO: 258 X10*3/UL (ref 150–450)
POTASSIUM SERPL-SCNC: 4 MMOL/L (ref 3.5–5.3)
PROT SERPL-MCNC: 6.9 G/DL (ref 6.4–8.2)
RBC # BLD AUTO: 4.76 X10*6/UL (ref 4–5.2)
SODIUM SERPL-SCNC: 140 MMOL/L (ref 136–145)
TRIGL SERPL-MCNC: 72 MG/DL (ref 0–149)
TSH SERPL-ACNC: 1.37 MIU/L (ref 0.44–3.98)
VLDL: 14 MG/DL (ref 0–40)
WBC # BLD AUTO: 5.7 X10*3/UL (ref 4.4–11.3)

## 2024-11-15 PROCEDURE — 77063 BREAST TOMOSYNTHESIS BI: CPT | Performed by: RADIOLOGY

## 2024-11-15 PROCEDURE — 85027 COMPLETE CBC AUTOMATED: CPT

## 2024-11-15 PROCEDURE — 80053 COMPREHEN METABOLIC PANEL: CPT

## 2024-11-15 PROCEDURE — 80061 LIPID PANEL: CPT

## 2024-11-15 PROCEDURE — 83036 HEMOGLOBIN GLYCOSYLATED A1C: CPT

## 2024-11-15 PROCEDURE — 36415 COLL VENOUS BLD VENIPUNCTURE: CPT

## 2024-11-15 PROCEDURE — 77067 SCR MAMMO BI INCL CAD: CPT

## 2024-11-15 PROCEDURE — 84443 ASSAY THYROID STIM HORMONE: CPT

## 2024-11-15 PROCEDURE — 77067 SCR MAMMO BI INCL CAD: CPT | Performed by: RADIOLOGY

## 2024-11-21 ENCOUNTER — HOSPITAL ENCOUNTER (OUTPATIENT)
Dept: CARDIOLOGY | Facility: CLINIC | Age: 69
Discharge: HOME | End: 2024-11-21
Payer: MEDICARE

## 2024-11-21 DIAGNOSIS — I49.5 SICK SINUS SYNDROME (MULTI): ICD-10-CM

## 2024-11-21 DIAGNOSIS — I47.20 VENTRICULAR TACHYCARDIA, UNSPECIFIED (MULTI): ICD-10-CM

## 2024-11-21 DIAGNOSIS — Z95.0 PRESENCE OF CARDIAC PACEMAKER: ICD-10-CM

## 2024-11-21 PROCEDURE — 93280 PM DEVICE PROGR EVAL DUAL: CPT | Performed by: INTERNAL MEDICINE

## 2024-11-21 PROCEDURE — 93280 PM DEVICE PROGR EVAL DUAL: CPT

## 2024-12-23 ENCOUNTER — HOSPITAL ENCOUNTER (OUTPATIENT)
Dept: CARDIOLOGY | Facility: CLINIC | Age: 69
Discharge: HOME | End: 2024-12-23
Payer: MEDICARE

## 2024-12-23 DIAGNOSIS — I44.2 COMPLETE HEART BLOCK: ICD-10-CM

## 2024-12-23 DIAGNOSIS — Z95.0 PRESENCE OF CARDIAC PACEMAKER: ICD-10-CM

## 2024-12-24 PROBLEM — E03.9 HYPOTHYROIDISM: Status: ACTIVE | Noted: 2024-12-24

## 2025-01-12 NOTE — PROGRESS NOTES
"CARDIAC ELECTROPHYSIOLOGY PROGRESS NOTE    History Of Present Illness:      This is a 69-year-old female with a history of complete heart block.  She has a Medtronic permanent pacemaker.  The patient also is being treated for inappropriate sinus tachycardia.  No complaints of palpitations, chest pain, or shortness of breath.    Review of Systems  Other review of systems negative     Last Recorded Vitals:      4/16/2024    12:07 PM 4/16/2024    12:11 PM 4/16/2024    12:18 PM 5/20/2024     2:57 PM 10/18/2024     1:41 PM 11/15/2024     7:52 AM 1/13/2025     8:53 AM   Vitals   Systolic 153 147 137  113  120   Diastolic 75 73 79  69  60   BP Location   Right arm  Right arm  Right arm   Heart Rate 60 60 71  63  53   Temp   36.7 °C (98.1 °F)       Resp   18       Height    1.575 m (5' 2\") 1.575 m (5' 2\") 1.575 m (5' 2\") 1.575 m (5' 2\")   Weight (lb)    138 152.6 140 151   BMI    25.24 kg/m2 27.91 kg/m2 25.61 kg/m2 27.62 kg/m2   BSA (m2)    1.65 m2 1.74 m2 1.67 m2 1.73 m2   Visit Report    Report Report  Report     Allergies:  Patient has no known allergies.    Outpatient Medications:  Current Outpatient Medications   Medication Instructions    albuterol 90 mcg/actuation inhaler 2 puffs, Every 4 hours PRN    Arexvy, PF, 120 mcg/0.5 mL suspension for reconstitution     atenolol (TENORMIN) 25 mg, Daily    dapagliflozin propanediol (FARXIGA) 5 mg, oral, Daily    fluticasone (Flonase) 50 mcg/actuation nasal spray 2 sprays, 2 times daily    fluticasone propion-salmeteroL (Advair Diskus) 500-50 mcg/dose diskus inhaler 1 puff, 2 times daily RT    gabapentin (Neurontin) 100 mg capsule 1 capsule, 3 times daily (0900,1400,1900)    ibuprofen 200 mg tablet Continuous PRN    ivabradine (CORLANOR) 5 mg, oral, 2 times daily    lovastatin (Mevacor) 40 mg tablet 1 tablet every 3rd day    omeprazole (PRILOSEC) 20 mg, oral, Daily    OneTouch Delica Plus Lancet 33 gauge misc Daily    OneTouch Ultra Test strip TEST ONCE DAILY    OneTouch Ultra2 " "Meter misc USE AS DIRECTED.    Systane Nighttime 94-3 % ointment ophthalmic ointment APPLY 1/2 INCH RIBBON IN LOWER CONJUNCTIVAL SAC AT BEDTIME NIGHTLY.       Physical Exam:    General Appearance:  Alert, oriented, no distress  Skin:  Warm and dry  Head and Neck:  No elevation of JVP, no carotid bruits  Cardiac Exam:  Rhythm is regular, S1 and S2 are normal, no murmur S3 or S4  Lungs:  Clear to auscultation  Extremities:  no edema  Neurologic:  No focal deficits  Psychiatric:  Appropriate mood and behavior    Lab Results:    CMP:  Recent Labs     11/15/24  0755 03/14/24  0809 08/03/23  1018 05/09/23  0855 12/09/22  1005 07/19/22  0918 01/14/22  0917 09/13/21  1146    141 138 139 140 138 140 142   K 4.0 3.9 4.3 4.4 4.3 4.2 4.5 4.4    104 101 104 104 101 102 105   CO2 27 28 28 28 28 28 29 30   ANIONGAP 13 13 13 11 12 13 14 11   BUN 14 15 12 14 14 13 16 14   CREATININE 0.81 0.74 0.80 0.83 0.79 0.91 0.93 0.96   EGFR 79 88  --   --   --   --   --   --    MG  --  2.10  --   --   --   --   --   --      Recent Labs     11/15/24  0755 03/14/24  0809 08/03/23  1018 05/09/23  0855 09/13/21  1146   ALBUMIN 4.0 3.8 4.4 4.0 4.0   ALKPHOS 149* 121 168* 175* 249*   ALT 19 28 29 22 25   AST 19 15 27 25 24   BILITOT 0.8 0.7 0.8 0.6 0.6     CBC:  Recent Labs     11/15/24  0755 03/14/24  0809 05/09/23  0855 09/13/21  1146 09/15/20  1010 10/08/19  1015 10/29/18  0927   WBC 5.7 8.1 3.9* 4.9 4.0* 3.7* 3.0*   HGB 13.7 13.4 13.6 15.1 14.8 15.2 14.5   HCT 42.3 40.5 42.1 44.8 46.4* 46.2* 45.0    263 220 234 219 206 198   MCV 89 89 91 92 92 90 93     COAG: No results for input(s): \"PTT\", \"INR\", \"HAUF\", \"DDIMERVTE\", \"HAPTOGLOBIN\", \"FIBRINOGEN\" in the last 13469 hours.  Cardiology Tests:  I have personally review the diagnostic cardiac testing and my interpretation is as follows:    Echocardiogram July 2022: Ejection fraction 50 to 55%.    Assessment/Plan   Problem List Items Addressed This Visit             ICD-10-CM    " Complete heart block I44.2     Seble is doing well from an EP perspective.  The pacemaker is functioning appropriately and the estimated battery longevity is 11 months.  The ejection fraction from the echocardiogram July 2024 is 50 to 55%.  We will consider an upgrade of her dual-chamber pacemaker to a CRT-P device at the time of the device replacement since she is pacemaker dependent and chronically pacing in the right ventricle.  EP follow-up in 8 to 10 months.         Relevant Orders    ECG 12 lead (Clinic Performed) (Completed)    Transthoracic echo (TTE) complete    Presence of cardiac pacemaker - Primary Z95.0     Medtronic Advisa DR implanted April 29, 2016  RA and RV leads Medtronic [id number], both implanted April 29, 2016  Estimated battery longevity 11 months based on device interrogation December 2024.         Relevant Orders    ECG 12 lead (Clinic Performed) (Completed)    Inappropriate sinus tachycardia (CMS-HCC) I47.11     Continue Corlanor 5 mg twice daily.            James C Ramicone, DO

## 2025-01-13 ENCOUNTER — APPOINTMENT (OUTPATIENT)
Dept: CARDIOLOGY | Facility: CLINIC | Age: 70
End: 2025-01-13
Payer: MEDICARE

## 2025-01-13 VITALS
SYSTOLIC BLOOD PRESSURE: 120 MMHG | OXYGEN SATURATION: 95 % | BODY MASS INDEX: 27.79 KG/M2 | HEIGHT: 62 IN | HEART RATE: 53 BPM | DIASTOLIC BLOOD PRESSURE: 60 MMHG | WEIGHT: 151 LBS

## 2025-01-13 DIAGNOSIS — I47.11 INAPPROPRIATE SINUS TACHYCARDIA (CMS-HCC): ICD-10-CM

## 2025-01-13 DIAGNOSIS — Z95.0 PRESENCE OF CARDIAC PACEMAKER: Primary | ICD-10-CM

## 2025-01-13 DIAGNOSIS — I44.2 COMPLETE HEART BLOCK: ICD-10-CM

## 2025-01-13 PROCEDURE — 93010 ELECTROCARDIOGRAM REPORT: CPT | Performed by: INTERNAL MEDICINE

## 2025-01-13 PROCEDURE — 1159F MED LIST DOCD IN RCRD: CPT | Performed by: INTERNAL MEDICINE

## 2025-01-13 PROCEDURE — 3008F BODY MASS INDEX DOCD: CPT | Performed by: INTERNAL MEDICINE

## 2025-01-13 PROCEDURE — 99214 OFFICE O/P EST MOD 30 MIN: CPT | Mod: 25 | Performed by: INTERNAL MEDICINE

## 2025-01-13 PROCEDURE — 93005 ELECTROCARDIOGRAM TRACING: CPT | Performed by: INTERNAL MEDICINE

## 2025-01-13 PROCEDURE — 3078F DIAST BP <80 MM HG: CPT | Performed by: INTERNAL MEDICINE

## 2025-01-13 PROCEDURE — 3074F SYST BP LT 130 MM HG: CPT | Performed by: INTERNAL MEDICINE

## 2025-01-13 PROCEDURE — 99214 OFFICE O/P EST MOD 30 MIN: CPT | Performed by: INTERNAL MEDICINE

## 2025-01-13 PROCEDURE — 1123F ACP DISCUSS/DSCN MKR DOCD: CPT | Performed by: INTERNAL MEDICINE

## 2025-01-13 NOTE — LETTER
"January 13, 2025     Mike Masterson MD  [address]  [place] [address]    Patient: Seble Reynolds   YOB: 1955   Date of Visit: 1/13/2025       Dear Dr. Mike Masterson MD:    Thank you for referring Seble Reynolds to me for evaluation. Below are my notes for this consultation.  If you have questions, please do not hesitate to call me. I look forward to following your patient along with you.       Sincerely,     James C Ramicone, DO      CC: No Recipients  ______________________________________________________________________________________    CARDIAC ELECTROPHYSIOLOGY PROGRESS NOTE    History Of Present Illness:      This is a 69-year-old female with a history of complete heart block.  She has a Medtronic permanent pacemaker.  The patient also is being treated for inappropriate sinus tachycardia.  No complaints of palpitations, chest pain, or shortness of breath.    Review of Systems  Other review of systems negative     Last Recorded Vitals:      4/16/2024    12:07 PM 4/16/2024    12:11 PM 4/16/2024    12:18 PM 5/20/2024     2:57 PM 10/18/2024     1:41 PM 11/15/2024     7:52 AM 1/13/2025     8:53 AM   Vitals   Systolic 153 147 137  113  120   Diastolic 75 73 79  69  60   BP Location   Right arm  Right arm  Right arm   Heart Rate 60 60 71  63  53   Temp   36.7 °C (98.1 °F)       Resp   18       Height    1.575 m (5' 2\") 1.575 m (5' 2\") 1.575 m (5' 2\") 1.575 m (5' 2\")   Weight (lb)    138 152.6 140 151   BMI    25.24 kg/m2 27.91 kg/m2 25.61 kg/m2 27.62 kg/m2   BSA (m2)    1.65 m2 1.74 m2 1.67 m2 1.73 m2   Visit Report    Report Report  Report     Allergies:  Patient has no known allergies.    Outpatient Medications:  Current Outpatient Medications   Medication Instructions   • albuterol 90 mcg/actuation inhaler 2 puffs, Every 4 hours PRN   • Arexvy, PF, 120 mcg/0.5 mL suspension for reconstitution    • atenolol (TENORMIN) 25 mg, Daily   • dapagliflozin propanediol (FARXIGA) " 5 mg, oral, Daily   • fluticasone (Flonase) 50 mcg/actuation nasal spray 2 sprays, 2 times daily   • fluticasone propion-salmeteroL (Advair Diskus) 500-50 mcg/dose diskus inhaler 1 puff, 2 times daily RT   • gabapentin (Neurontin) 100 mg capsule 1 capsule, 3 times daily (0900,1400,1900)   • ibuprofen 200 mg tablet Continuous PRN   • ivabradine (CORLANOR) 5 mg, oral, 2 times daily   • lovastatin (Mevacor) 40 mg tablet 1 tablet every 3rd day   • omeprazole (PRILOSEC) 20 mg, oral, Daily   • OneTouch Delica Plus Lancet 33 gauge misc Daily   • OneTouch Ultra Test strip TEST ONCE DAILY   • OneTouch Ultra2 Meter misc USE AS DIRECTED.   • Systane Nighttime 94-3 % ointment ophthalmic ointment APPLY 1/2 INCH RIBBON IN LOWER CONJUNCTIVAL SAC AT BEDTIME NIGHTLY.       Physical Exam:    General Appearance:  Alert, oriented, no distress  Skin:  Warm and dry  Head and Neck:  No elevation of JVP, no carotid bruits  Cardiac Exam:  Rhythm is regular, S1 and S2 are normal, no murmur S3 or S4  Lungs:  Clear to auscultation  Extremities:  no edema  Neurologic:  No focal deficits  Psychiatric:  Appropriate mood and behavior    Lab Results:    CMP:  Recent Labs     11/15/24  0755 03/14/24  0809 08/03/23  1018 05/09/23  0855 12/09/22  1005 07/19/22  0918 01/14/22  0917 09/13/21  1146    141 138 139 140 138 140 142   K 4.0 3.9 4.3 4.4 4.3 4.2 4.5 4.4    104 101 104 104 101 102 105   CO2 27 28 28 28 28 28 29 30   ANIONGAP 13 13 13 11 12 13 14 11   BUN 14 15 12 14 14 13 16 14   CREATININE 0.81 0.74 0.80 0.83 0.79 0.91 0.93 0.96   EGFR 79 88  --   --   --   --   --   --    MG  --  2.10  --   --   --   --   --   --      Recent Labs     11/15/24  0755 03/14/24  0809 08/03/23  1018 05/09/23  0855 09/13/21  1146   ALBUMIN 4.0 3.8 4.4 4.0 4.0   ALKPHOS 149* 121 168* 175* 249*   ALT 19 28 29 22 25   AST 19 15 27 25 24   BILITOT 0.8 0.7 0.8 0.6 0.6     CBC:  Recent Labs     11/15/24  0755 03/14/24  0809 05/09/23  0855 09/13/21  1146  "09/15/20  1010 10/08/19  1015 10/29/18  0927   WBC 5.7 8.1 3.9* 4.9 4.0* 3.7* 3.0*   HGB 13.7 13.4 13.6 15.1 14.8 15.2 14.5   HCT 42.3 40.5 42.1 44.8 46.4* 46.2* 45.0    263 220 234 219 206 198   MCV 89 89 91 92 92 90 93     COAG: No results for input(s): \"PTT\", \"INR\", \"HAUF\", \"DDIMERVTE\", \"HAPTOGLOBIN\", \"FIBRINOGEN\" in the last 12959 hours.  Cardiology Tests:  I have personally review the diagnostic cardiac testing and my interpretation is as follows:    Echocardiogram July 2022: Ejection fraction 50 to 55%.    Assessment/Plan  Problem List Items Addressed This Visit             ICD-10-CM    Complete heart block I44.2     Seble is doing well from an EP perspective.  The pacemaker is functioning appropriately and the estimated battery longevity is 11 months.  The ejection fraction from the echocardiogram July 2024 is 50 to 55%.  We will consider an upgrade of her dual-chamber pacemaker to a CRT-P device at the time of the device replacement since she is pacemaker dependent and chronically pacing in the right ventricle.  EP follow-up in 8 to 10 months.         Relevant Orders    ECG 12 lead (Clinic Performed) (Completed)    Transthoracic echo (TTE) complete    Presence of cardiac pacemaker - Primary Z95.0     Medtronic Advisa DR implanted April 29, 2016  RA and RV leads Medtronic [id number], both implanted April 29, 2016  Estimated battery longevity 11 months based on device interrogation December 2024.         Relevant Orders    ECG 12 lead (Clinic Performed) (Completed)    Inappropriate sinus tachycardia (CMS-HCC) I47.11     Continue Corlanor 5 mg twice daily.            James C Ramicone, DO    "

## 2025-01-13 NOTE — ASSESSMENT & PLAN NOTE
Seble is doing well from an EP perspective.  The pacemaker is functioning appropriately and the estimated battery longevity is 11 months.  The ejection fraction from the echocardiogram July 2024 is 50 to 55%.  We will consider an upgrade of her dual-chamber pacemaker to a CRT-P device at the time of the device replacement since she is pacemaker dependent and chronically pacing in the right ventricle.  EP follow-up in 8 to 10 months.

## 2025-01-13 NOTE — ASSESSMENT & PLAN NOTE
Medtronic Linsey OBRIEN implanted April 29, 2016  RA and RV leads Medtronic [id number], both implanted April 29, 2016  Estimated battery longevity 11 months based on device interrogation December 2024.

## 2025-01-14 LAB
ATRIAL RATE: 78 BPM
P AXIS: 68 DEGREES
P OFFSET: 132 MS
P ONSET: 93 MS
PR INTERVAL: 228 MS
Q ONSET: 194 MS
QRS COUNT: 12 BEATS
QRS DURATION: 154 MS
QT INTERVAL: 420 MS
QTC CALCULATION(BAZETT): 478 MS
QTC FREDERICIA: 458 MS
R AXIS: -71 DEGREES
T AXIS: 91 DEGREES
T OFFSET: 404 MS
VENTRICULAR RATE: 78 BPM

## 2025-01-24 ENCOUNTER — TELEPHONE (OUTPATIENT)
Dept: GASTROENTEROLOGY | Facility: CLINIC | Age: 70
End: 2025-01-24
Payer: MEDICARE

## 2025-01-24 DIAGNOSIS — Z12.11 COLON CANCER SCREENING: Primary | ICD-10-CM

## 2025-01-24 NOTE — TELEPHONE ENCOUNTER
Pt called back to schedule Colonoscopy. She did not want to schedule in [place]. Relayed to patient that we would be sending a clearance request to stop her Farxiga 3 days prior to procedure to Dr Masterson. She voiced understanding.

## 2025-01-24 NOTE — TELEPHONE ENCOUNTER
Called patient and LM letting her know that she does not need to come in for an office visit if she only wants to schedule a colonoscopy and that we can get her in as early as next week in [place]. Advised her to call the office to schedule.

## 2025-01-27 ENCOUNTER — HOSPITAL ENCOUNTER (OUTPATIENT)
Dept: CARDIOLOGY | Facility: CLINIC | Age: 70
Discharge: HOME | End: 2025-01-27
Payer: MEDICARE

## 2025-01-27 DIAGNOSIS — Z95.0 PRESENCE OF CARDIAC PACEMAKER: ICD-10-CM

## 2025-01-27 DIAGNOSIS — I44.2 COMPLETE HEART BLOCK: ICD-10-CM

## 2025-01-27 RX ORDER — SOD SULF/POT CHLORIDE/MAG SULF 1.479 G
12 TABLET ORAL 2 TIMES DAILY
Qty: 24 TABLET | Refills: 0 | Status: SHIPPED | OUTPATIENT
Start: 2025-01-27

## 2025-01-27 NOTE — TELEPHONE ENCOUNTER
Call to patient to let her know that she is to stop her Farxiga 3 days prior to her colonoscopy on /14/25 and resume 2 days after. Pt voiced understanding.

## 2025-01-28 DIAGNOSIS — Z12.11 COLON CANCER SCREENING: Primary | ICD-10-CM

## 2025-01-29 RX ORDER — SODIUM, POTASSIUM,MAG SULFATES 17.5-3.13G
SOLUTION, RECONSTITUTED, ORAL ORAL
Qty: 354 ML | Refills: 0 | Status: SHIPPED | OUTPATIENT
Start: 2025-01-29

## 2025-02-11 ENCOUNTER — APPOINTMENT (OUTPATIENT)
Dept: GASTROENTEROLOGY | Facility: CLINIC | Age: 70
End: 2025-02-11
Payer: MEDICARE

## 2025-02-24 ENCOUNTER — HOSPITAL ENCOUNTER (OUTPATIENT)
Dept: CARDIOLOGY | Facility: CLINIC | Age: 70
Discharge: HOME | End: 2025-02-24
Payer: MEDICARE

## 2025-02-24 DIAGNOSIS — I44.2 COMPLETE HEART BLOCK: ICD-10-CM

## 2025-02-24 DIAGNOSIS — Z95.0 PRESENCE OF CARDIAC PACEMAKER: ICD-10-CM

## 2025-02-24 PROCEDURE — 93296 REM INTERROG EVL PM/IDS: CPT

## 2025-03-31 ENCOUNTER — HOSPITAL ENCOUNTER (OUTPATIENT)
Dept: CARDIOLOGY | Facility: CLINIC | Age: 70
Discharge: HOME | End: 2025-03-31
Payer: MEDICARE

## 2025-03-31 DIAGNOSIS — I44.2 COMPLETE HEART BLOCK: ICD-10-CM

## 2025-03-31 DIAGNOSIS — Z95.0 PRESENCE OF CARDIAC PACEMAKER: ICD-10-CM

## 2025-04-01 DIAGNOSIS — Z95.0 PRESENCE OF CARDIAC PACEMAKER: ICD-10-CM

## 2025-04-01 DIAGNOSIS — I44.2 COMPLETE HEART BLOCK: ICD-10-CM

## 2025-04-14 ENCOUNTER — ANESTHESIA (OUTPATIENT)
Dept: GASTROENTEROLOGY | Facility: HOSPITAL | Age: 70
End: 2025-04-14
Payer: MEDICARE

## 2025-04-14 ENCOUNTER — HOSPITAL ENCOUNTER (OUTPATIENT)
Dept: GASTROENTEROLOGY | Facility: HOSPITAL | Age: 70
Discharge: HOME | End: 2025-04-14
Payer: MEDICARE

## 2025-04-14 ENCOUNTER — ANESTHESIA EVENT (OUTPATIENT)
Dept: GASTROENTEROLOGY | Facility: HOSPITAL | Age: 70
End: 2025-04-14
Payer: MEDICARE

## 2025-04-14 VITALS
TEMPERATURE: 98.1 F | DIASTOLIC BLOOD PRESSURE: 63 MMHG | SYSTOLIC BLOOD PRESSURE: 135 MMHG | HEART RATE: 60 BPM | OXYGEN SATURATION: 97 % | RESPIRATION RATE: 16 BRPM

## 2025-04-14 DIAGNOSIS — Z12.11 COLON CANCER SCREENING: ICD-10-CM

## 2025-04-14 PROBLEM — R11.2 PONV (POSTOPERATIVE NAUSEA AND VOMITING): Status: ACTIVE | Noted: 2025-04-14

## 2025-04-14 PROBLEM — Z98.890 PONV (POSTOPERATIVE NAUSEA AND VOMITING): Status: ACTIVE | Noted: 2025-04-14

## 2025-04-14 LAB — GLUCOSE BLD MANUAL STRIP-MCNC: 138 MG/DL (ref 74–99)

## 2025-04-14 PROCEDURE — 3700000002 HC GENERAL ANESTHESIA TIME - EACH INCREMENTAL 1 MINUTE

## 2025-04-14 PROCEDURE — A45385 PR COLONOSCOPY,REMV LESN,SNARE: Performed by: ANESTHESIOLOGY

## 2025-04-14 PROCEDURE — 7100000009 HC PHASE TWO TIME - INITIAL BASE CHARGE

## 2025-04-14 PROCEDURE — 82947 ASSAY GLUCOSE BLOOD QUANT: CPT

## 2025-04-14 PROCEDURE — 45385 COLONOSCOPY W/LESION REMOVAL: CPT | Performed by: INTERNAL MEDICINE

## 2025-04-14 PROCEDURE — 7100000010 HC PHASE TWO TIME - EACH INCREMENTAL 1 MINUTE

## 2025-04-14 PROCEDURE — A45385 PR COLONOSCOPY,REMV LESN,SNARE: Performed by: ANESTHESIOLOGIST ASSISTANT

## 2025-04-14 PROCEDURE — 3700000001 HC GENERAL ANESTHESIA TIME - INITIAL BASE CHARGE

## 2025-04-14 PROCEDURE — 2500000004 HC RX 250 GENERAL PHARMACY W/ HCPCS (ALT 636 FOR OP/ED): Performed by: ANESTHESIOLOGY

## 2025-04-14 PROCEDURE — 2500000004 HC RX 250 GENERAL PHARMACY W/ HCPCS (ALT 636 FOR OP/ED): Performed by: ANESTHESIOLOGIST ASSISTANT

## 2025-04-14 RX ORDER — SODIUM CHLORIDE 0.9 % (FLUSH) 0.9 %
SYRINGE (ML) INJECTION AS NEEDED
Status: DISCONTINUED | OUTPATIENT
Start: 2025-04-14 | End: 2025-04-14

## 2025-04-14 RX ORDER — PROPOFOL 10 MG/ML
INJECTION, EMULSION INTRAVENOUS AS NEEDED
Status: DISCONTINUED | OUTPATIENT
Start: 2025-04-14 | End: 2025-04-14

## 2025-04-14 RX ORDER — ONDANSETRON HYDROCHLORIDE 2 MG/ML
4 INJECTION, SOLUTION INTRAVENOUS ONCE
Status: COMPLETED | OUTPATIENT
Start: 2025-04-14 | End: 2025-04-14

## 2025-04-14 RX ORDER — LIDOCAINE HCL/PF 100 MG/5ML
SYRINGE (ML) INTRAVENOUS AS NEEDED
Status: DISCONTINUED | OUTPATIENT
Start: 2025-04-14 | End: 2025-04-14

## 2025-04-14 RX ADMIN — PROPOFOL 70 MG: 10 INJECTION, EMULSION INTRAVENOUS at 10:06

## 2025-04-14 RX ADMIN — PROPOFOL 100 MCG/KG/MIN: 10 INJECTION, EMULSION INTRAVENOUS at 10:07

## 2025-04-14 RX ADMIN — LIDOCAINE HYDROCHLORIDE 50 MG: 20 INJECTION, SOLUTION INTRAVENOUS at 10:06

## 2025-04-14 RX ADMIN — Medication 10 ML: at 10:27

## 2025-04-14 RX ADMIN — ONDANSETRON 4 MG: 2 INJECTION INTRAMUSCULAR; INTRAVENOUS at 09:04

## 2025-04-14 SDOH — HEALTH STABILITY: MENTAL HEALTH: CURRENT SMOKER: 0

## 2025-04-14 ASSESSMENT — PAIN SCALES - GENERAL
PAINLEVEL_OUTOF10: 0 - NO PAIN

## 2025-04-14 ASSESSMENT — PAIN - FUNCTIONAL ASSESSMENT: PAIN_FUNCTIONAL_ASSESSMENT: 0-10

## 2025-04-14 ASSESSMENT — COLUMBIA-SUICIDE SEVERITY RATING SCALE - C-SSRS
1. IN THE PAST MONTH, HAVE YOU WISHED YOU WERE DEAD OR WISHED YOU COULD GO TO SLEEP AND NOT WAKE UP?: NO
6. HAVE YOU EVER DONE ANYTHING, STARTED TO DO ANYTHING, OR PREPARED TO DO ANYTHING TO END YOUR LIFE?: NO
2. HAVE YOU ACTUALLY HAD ANY THOUGHTS OF KILLING YOURSELF?: NO

## 2025-04-14 NOTE — ANESTHESIA PREPROCEDURE EVALUATION
Patient: Seble Reynolds    Procedure Information       Date/Time: 04/14/25 0950    Scheduled providers: Johnny Cuellar MD; Elijah Cohen MD    Procedure: COLONOSCOPY    Location: [facility]            Relevant Problems   Anesthesia   (+) PONV (postoperative nausea and vomiting)      Cardiac   (+) Complete heart block   (+) Endogenous hyperlipidemia   (+) Heart murmur   (+) Inappropriate sinus tachycardia (CMS-HCC)   (+) Presence of cardiac pacemaker      Pulmonary   (+) Asthma      Neuro   (+) Chronic cervical radiculopathy   (+) Lumbar radiculopathy, chronic   (+) Polyneuropathy, peripheral sensorimotor axonal      GI   (+) GERD (gastroesophageal reflux disease)      Liver   (+) Sarcoidosis with granulomatous hepatitis      Endocrine   (+) Hypothyroidism   (+) Type 2 diabetes mellitus, without long-term current use of insulin (Multi)       Clinical information reviewed:   Tobacco  Allergies  Meds   Med Hx  Surg Hx   Fam Hx          NPO Detail:  NPO/Void Status  Carbohydrate Drink Given Prior to Surgery? : N  Last Intake Type: Solid meal  Time of Last Void: 0835       CONCLUSIONS:   1. Left ventricular ejection fraction is low normal, by visual estimate at 50-55%.   2. Spectral Doppler shows an impaired relaxation pattern of left ventricular diastolic filling.   3. There is normal right ventricular global systolic function.     Physical Exam    Airway  Mallampati: II  TM distance: >3 FB  Neck ROM: full     Cardiovascular - normal exam  Rhythm: regular  Rate: normal     Dental - normal exam     Pulmonary - normal exam  Breath sounds clear to auscultation     Abdominal            Anesthesia Plan    History of general anesthesia?: yes  History of complications of general anesthesia?: no    ASA 3     MAC     The patient is not a current smoker.    intravenous induction   Anesthetic plan and risks discussed with patient.  Use of blood products discussed with patient who.    Plan discussed with CAA.

## 2025-04-14 NOTE — ANESTHESIA POSTPROCEDURE EVALUATION
Patient: Seble Reynolds    Procedure Summary       Date: 04/14/25 Room / Location: [facility]    Anesthesia Start: 1003 Anesthesia Stop: 1033    Procedure: COLONOSCOPY Diagnosis: Colon cancer screening    Scheduled Providers: Johnny Cuellar MD; Elijah Cohen MD Responsible Provider: Elijah Cohen MD    Anesthesia Type: MAC ASA Status: 3            Anesthesia Type: MAC    Vitals Value Taken Time   /63 04/14/25 1100   Temp 36.7 °C (98.1 °F) 04/14/25 1033   Pulse 59 04/14/25 1111   Resp 16 04/14/25 1100   SpO2 96 % 04/14/25 1111   Vitals shown include unfiled device data.    Anesthesia Post Evaluation    Patient location during evaluation: PACU  Patient participation: complete - patient participated  Level of consciousness: awake and alert  Pain management: adequate  Airway patency: patent  Cardiovascular status: acceptable  Respiratory status: acceptable  Hydration status: acceptable  Postoperative Nausea and Vomiting: none        No notable events documented.

## 2025-04-14 NOTE — H&P
Procedure H&P    Patient Profile-Procedures  Name Seble Reynolds  Date of Birth 1955  MRN [id number]  Address   [address] [place] [id number] [place] [id number]    Primary Phone Number [phone]  Secondary Phone Number    Chapin Crespo    Procedure(s):  Procedures: colonoscopy  Primary contact name and number   Extended Emergency Contact Information  Primary Emergency Contact: Mert Reynolds  Address: [id number] [place], [place] [id number] [facility]  Home Phone: [phone]  Work Phone: [phone]  Mobile Phone: [phone]  Relation: Spouse   needed? No  Secondary Emergency Contact: ANGELA VELEZ  Address: [address] [place] [address]  Home Phone: [phone]  Work Phone: [phone]  Mobile Phone: [phone]  Relation: Daughter   needed? No    General Health  Weight There were no vitals filed for this visit.  BMI There is no height or weight on file to calculate BMI.    Allergies  Allergies   Allergen Reactions    Bactrim [Sulfamethoxazole-Trimethoprim] Hives       Past Medical History   Past Medical History:   Diagnosis Date    Abnormal levels of other serum enzymes 10/25/2022    Elevated alkaline phosphatase level    Acute respiratory failure, unspecified whether with hypoxia or hypercapnia 11/21/2022    Acute respiratory failure, unsp w hypoxia or hypercapnia    Arthritis Do not know    Asthma     Diabetes mellitus (Multi)     Encounter for screening mammogram for malignant neoplasm of breast 07/18/2022    Visit for screening mammogram    GERD (gastroesophageal reflux disease)     Other hereditary and idiopathic neuropathies 10/25/2022    Polyneuropathy, peripheral sensorimotor axonal    Other pulmonary embolism without acute cor pulmonale 11/21/2022    Pulmonary embolism    Other specific joint derangements of unspecified hand, not elsewhere classified 08/11/2022     Locking finger joint    Personal history of diseases of the blood and blood-forming organs and certain disorders involving the immune mechanism     History of sarcoidosis    Personal history of other diseases of the circulatory system 12/14/2018    History of Mobitz type II block    Personal history of other endocrine, nutritional and metabolic disease     History of hyperlipidemia    Personal history of other specified conditions     History of syncope    Personal history of other specified conditions 12/13/2019    History of tachycardia       Provider assessment  Diagnosis: Colon Cancer Screening/Surveillance   Medication Reviewed - yes  Prior to Admission medications    Medication Sig Start Date End Date Taking? Authorizing Provider   atenolol (Tenormin) 50 mg tablet Take 0.5 tablets (25 mg) by mouth once daily.   Yes Historical Provider, MD   fluticasone (Flonase) 50 mcg/actuation nasal spray Administer 2 sprays into each nostril 2 times a day.   Yes Historical Provider, MD   fluticasone propion-salmeteroL (Advair Diskus) 500-50 mcg/dose diskus inhaler Inhale 1 puff 2 times a day. Rinse mouth with water after use to reduce aftertaste and incidence of candidiasis. Do not swallow.   Yes Historical Provider, MD   gabapentin (Neurontin) 100 mg capsule Take 1 capsule (100 mg) by mouth 3 times a day. 10/9/24  Yes Historical Provider, MD   ivabradine (Corlanor) 5 mg tablet Take 1 tablet (5 mg) by mouth 2 times a day. 6/19/24  Yes James C Ramicone, DO   OneTouch Delica Plus Lancet 33 gauge misc once daily. 7/1/23  Yes Historical Provider, MD   OneTouch Ultra Test strip TEST ONCE DAILY 7/1/23  Yes Historical Provider, MD   OneTouch Ultra2 Meter misc USE AS DIRECTED. 1/16/23  Yes Historical Provider, MD   Systane Nighttime 94-3 % ointment ophthalmic ointment APPLY 1/2 INCH RIBBON IN LOWER CONJUNCTIVAL SAC AT BEDTIME NIGHTLY. 7/1/23  Yes Historical Provider, MD   albuterol 90 mcg/actuation inhaler Inhale 2 puffs every 4  hours if needed.  Patient not taking: Reported on 4/14/2025    Historical Provider, MD   Arexvy, PF, 120 mcg/0.5 mL suspension for reconstitution  1/26/24   Historical Provider, MD   dapagliflozin propanediol (Farxiga) 5 mg Take 1 tablet (5 mg) by mouth once daily. 10/25/24 11/29/25  Mike Masterson MD   ibuprofen 200 mg tablet Take by mouth continuously if needed. 6/5/08   Historical Provider, MD   lovastatin (Mevacor) 40 mg tablet 1 tablet every 3rd day 10/18/24   Mike Masterson MD   omeprazole (PriLOSEC) 20 mg DR capsule Take 1 capsule (20 mg) by mouth once daily. 6/25/24 6/25/25  Mike Masterson MD   sod sulf-pot chloride-mag sulf (Sutab) 1.479-0.188- 0.225 gram tablet tablet Take 12 tablets by mouth 2 times a day. 1/27/25 4/14/25  Johnny Cuellar MD   sodium,potassium,mag sulfates (Suprep) 17.5-3.13-1.6 gram solution Take one bottle twice as directed by the prep instructions 1/29/25 4/14/25  Johnny Cuellar MD       Physical Exam  Vitals:    04/14/25 0857   BP: 137/73   Pulse: 63   Resp: 20   Temp: 36.6 °C (97.9 °F)   SpO2: 95%        General: A&Ox3, NAD.  HEENT: AT/NC.   CV: RRR. No murmur.  Resp: CTA bilaterally. No wheezing, rhonchi or rales.   GI: Soft, NT/ND. BSx4.  Extrem: No edema. Pulses intact.  Neuro: No focal deficits.   Psych: Normal mood and affect.      Procedure Plan - pre-procedural (re)assesment completed by physician:  discharge/transfer patient when discharge criteria met    Johnny Cuellar MD  4/14/2025 10:05 AM

## 2025-04-21 ENCOUNTER — APPOINTMENT (OUTPATIENT)
Dept: PRIMARY CARE | Facility: CLINIC | Age: 70
End: 2025-04-21
Payer: MEDICARE

## 2025-04-21 VITALS
BODY MASS INDEX: 28.16 KG/M2 | DIASTOLIC BLOOD PRESSURE: 76 MMHG | HEART RATE: 81 BPM | WEIGHT: 153 LBS | HEIGHT: 62 IN | SYSTOLIC BLOOD PRESSURE: 115 MMHG | OXYGEN SATURATION: 95 %

## 2025-04-21 DIAGNOSIS — R73.9 HYPERGLYCEMIA: ICD-10-CM

## 2025-04-21 DIAGNOSIS — M79.641 PAIN IN BOTH HANDS: ICD-10-CM

## 2025-04-21 DIAGNOSIS — M79.642 PAIN IN BOTH HANDS: ICD-10-CM

## 2025-04-21 DIAGNOSIS — E11.9 TYPE 2 DIABETES MELLITUS WITHOUT COMPLICATION, WITHOUT LONG-TERM CURRENT USE OF INSULIN: Primary | ICD-10-CM

## 2025-04-21 PROCEDURE — G2211 COMPLEX E/M VISIT ADD ON: HCPCS | Performed by: INTERNAL MEDICINE

## 2025-04-21 PROCEDURE — 1159F MED LIST DOCD IN RCRD: CPT | Performed by: INTERNAL MEDICINE

## 2025-04-21 PROCEDURE — 1123F ACP DISCUSS/DSCN MKR DOCD: CPT | Performed by: INTERNAL MEDICINE

## 2025-04-21 PROCEDURE — 99214 OFFICE O/P EST MOD 30 MIN: CPT | Performed by: INTERNAL MEDICINE

## 2025-04-21 PROCEDURE — 3078F DIAST BP <80 MM HG: CPT | Performed by: INTERNAL MEDICINE

## 2025-04-21 PROCEDURE — 3074F SYST BP LT 130 MM HG: CPT | Performed by: INTERNAL MEDICINE

## 2025-04-21 PROCEDURE — 1036F TOBACCO NON-USER: CPT | Performed by: INTERNAL MEDICINE

## 2025-04-21 PROCEDURE — 3008F BODY MASS INDEX DOCD: CPT | Performed by: INTERNAL MEDICINE

## 2025-04-21 PROCEDURE — 1158F ADVNC CARE PLAN TLK DOCD: CPT | Performed by: INTERNAL MEDICINE

## 2025-04-21 RX ORDER — GABAPENTIN 300 MG/1
300 CAPSULE ORAL 3 TIMES DAILY
Qty: 90 CAPSULE | Refills: 5 | Status: SHIPPED | OUTPATIENT
Start: 2025-04-21 | End: 2025-10-18

## 2025-04-21 ASSESSMENT — PATIENT HEALTH QUESTIONNAIRE - PHQ9
2. FEELING DOWN, DEPRESSED OR HOPELESS: NOT AT ALL
1. LITTLE INTEREST OR PLEASURE IN DOING THINGS: NOT AT ALL
SUM OF ALL RESPONSES TO PHQ9 QUESTIONS 1 & 2: 0

## 2025-04-21 NOTE — PROGRESS NOTES
"Subjective   Patient ID: Seble Reynolds is a 69 y.o. female who presents for Follow-up.    HPI   Bms back to baseline after stopping metformin  Taking farxiga, no side effects.  FBS 120s at home  Optho UTD  Denies neuropathy    Hands and feet still cramp. Gabapentin from [facility] didn't do much. She has not been back to see them.      Review of Systems    Objective   /76 (Patient Position: Sitting)   Pulse 81   Ht 1.575 m (5' 2\")   Wt 69.4 kg (153 lb)   SpO2 95%   BMI 27.98 kg/m²     Physical Exam  Constitutional:       Appearance: Normal appearance.   Cardiovascular:      Rate and Rhythm: Normal rate and regular rhythm.      Heart sounds: No murmur heard.  Pulmonary:      Effort: Pulmonary effort is normal.      Breath sounds: Normal breath sounds.   Musculoskeletal:      Right lower leg: No edema.      Left lower leg: No edema.   Neurological:      General: No focal deficit present.      Mental Status: She is alert.      Gait: Gait normal.         Assessment/Plan   Problem List Items Addressed This Visit           ICD-10-CM    Type 2 diabetes mellitus, without long-term current use of insulin (Multi) - Primary E11.9    Relevant Orders    Albumin-Creatinine Ratio, Urine Random    Comprehensive Metabolic Panel    Hemoglobin A1C    Pain in both hands M79.641, M79.642    Relevant Medications    gabapentin (Neurontin) 300 mg capsule     Other Visit Diagnoses         Codes      Hyperglycemia     R73.9             DM  -Check A1C  -Continue farxiga.   -Sees Opth yearly     Sarcoid - Sees Dr Aquion    GERD - continue PPI, never had EGD     CHB s/p PPM - Sees Dr Ramicone     Asthma - Sees Pulm     Sarcoidosis  - Sees Dr Aquino     HLP - continue lovastatin every third day     Hand cramps/pain - Has seen Rheumatology but not followed up. Will increase gabapentin.     HM:  -Colonoscopy 4/2025, due in 3 years  -Mamm - nml 11/2024  -Shingrix - UTD  -Prevnar 20 UTD     Follow up 6 months MWV  "

## 2025-04-22 ENCOUNTER — APPOINTMENT (OUTPATIENT)
Dept: PRIMARY CARE | Facility: CLINIC | Age: 70
End: 2025-04-22
Payer: MEDICARE

## 2025-04-23 LAB
LABORATORY COMMENT REPORT: NORMAL
PATH REPORT.FINAL DX SPEC: NORMAL
PATH REPORT.GROSS SPEC: NORMAL
PATH REPORT.RELEVANT HX SPEC: NORMAL
PATH REPORT.TOTAL CANCER: NORMAL

## 2025-05-05 ENCOUNTER — HOSPITAL ENCOUNTER (OUTPATIENT)
Dept: CARDIOLOGY | Facility: CLINIC | Age: 70
Discharge: HOME | End: 2025-05-05
Payer: MEDICARE

## 2025-05-05 DIAGNOSIS — I44.2 COMPLETE HEART BLOCK: ICD-10-CM

## 2025-05-05 DIAGNOSIS — Z95.0 PRESENCE OF CARDIAC PACEMAKER: ICD-10-CM

## 2025-05-14 LAB
ALBUMIN SERPL-MCNC: 4 G/DL (ref 3.6–5.1)
ALBUMIN/CREAT UR: 3 MG/G CREAT
ALP SERPL-CCNC: 130 U/L (ref 37–153)
ALT SERPL-CCNC: 16 U/L (ref 6–29)
ANION GAP SERPL CALCULATED.4IONS-SCNC: 11 MMOL/L (CALC) (ref 7–17)
AST SERPL-CCNC: 20 U/L (ref 10–35)
BILIRUB SERPL-MCNC: 0.6 MG/DL (ref 0.2–1.2)
BUN SERPL-MCNC: 13 MG/DL (ref 7–25)
CALCIUM SERPL-MCNC: 9.2 MG/DL (ref 8.6–10.4)
CHLORIDE SERPL-SCNC: 103 MMOL/L (ref 98–110)
CO2 SERPL-SCNC: 27 MMOL/L (ref 20–32)
CREAT SERPL-MCNC: 0.76 MG/DL (ref 0.5–1.05)
CREAT UR-MCNC: 101 MG/DL (ref 20–275)
EGFRCR SERPLBLD CKD-EPI 2021: 85 ML/MIN/1.73M2
EST. AVERAGE GLUCOSE BLD GHB EST-MCNC: 171 MG/DL
EST. AVERAGE GLUCOSE BLD GHB EST-SCNC: 9.5 MMOL/L
GLUCOSE SERPL-MCNC: 131 MG/DL (ref 65–99)
HBA1C MFR BLD: 7.6 %
MICROALBUMIN UR-MCNC: 0.3 MG/DL
POTASSIUM SERPL-SCNC: 4.4 MMOL/L (ref 3.5–5.3)
PROT SERPL-MCNC: 6.9 G/DL (ref 6.1–8.1)
SODIUM SERPL-SCNC: 141 MMOL/L (ref 135–146)

## 2025-05-17 ENCOUNTER — PATIENT MESSAGE (OUTPATIENT)
Dept: PRIMARY CARE | Facility: CLINIC | Age: 70
End: 2025-05-17
Payer: MEDICARE

## 2025-05-17 DIAGNOSIS — E11.9 TYPE 2 DIABETES MELLITUS WITHOUT COMPLICATION, WITHOUT LONG-TERM CURRENT USE OF INSULIN: ICD-10-CM

## 2025-05-17 RX ORDER — DAPAGLIFLOZIN 10 MG/1
10 TABLET, FILM COATED ORAL DAILY
Qty: 90 TABLET | Refills: 3 | Status: SHIPPED | OUTPATIENT
Start: 2025-05-17 | End: 2026-05-17

## 2025-05-19 DIAGNOSIS — R00.2 PALPITATIONS: Primary | ICD-10-CM

## 2025-05-19 RX ORDER — ATENOLOL 50 MG/1
25 TABLET ORAL DAILY
Qty: 45 TABLET | Refills: 2 | Status: SHIPPED | OUTPATIENT
Start: 2025-05-19

## 2025-06-02 ENCOUNTER — HOSPITAL ENCOUNTER (OUTPATIENT)
Dept: CARDIOLOGY | Facility: CLINIC | Age: 70
Discharge: HOME | End: 2025-06-02
Payer: MEDICARE

## 2025-06-02 DIAGNOSIS — I44.2 COMPLETE HEART BLOCK: ICD-10-CM

## 2025-06-02 DIAGNOSIS — Z95.0 PRESENCE OF CARDIAC PACEMAKER: ICD-10-CM

## 2025-06-02 PROCEDURE — 93294 REM INTERROG EVL PM/LDLS PM: CPT | Performed by: INTERNAL MEDICINE

## 2025-06-02 PROCEDURE — 93296 REM INTERROG EVL PM/IDS: CPT

## 2025-07-07 ENCOUNTER — HOSPITAL ENCOUNTER (OUTPATIENT)
Dept: CARDIOLOGY | Facility: CLINIC | Age: 70
Discharge: HOME | End: 2025-07-07
Payer: MEDICARE

## 2025-07-07 DIAGNOSIS — Z95.0 PRESENCE OF CARDIAC PACEMAKER: ICD-10-CM

## 2025-07-07 DIAGNOSIS — I44.2 COMPLETE HEART BLOCK: ICD-10-CM

## 2025-07-09 DIAGNOSIS — I47.11 INAPPROPRIATE SINUS TACHYCARDIA: ICD-10-CM

## 2025-07-09 RX ORDER — IVABRADINE 5 MG/1
5 TABLET, FILM COATED ORAL 2 TIMES DAILY
Qty: 180 TABLET | Refills: 3 | Status: SHIPPED | OUTPATIENT
Start: 2025-07-09 | End: 2026-07-09

## 2025-07-14 ENCOUNTER — HOSPITAL ENCOUNTER (OUTPATIENT)
Dept: CARDIOLOGY | Facility: CLINIC | Age: 70
Discharge: HOME | End: 2025-07-14
Payer: MEDICARE

## 2025-07-14 VITALS
BODY MASS INDEX: 28.16 KG/M2 | SYSTOLIC BLOOD PRESSURE: 115 MMHG | WEIGHT: 153 LBS | HEIGHT: 62 IN | DIASTOLIC BLOOD PRESSURE: 76 MMHG

## 2025-07-14 DIAGNOSIS — R94.31 ABNORMAL ELECTROCARDIOGRAM (ECG) (EKG): ICD-10-CM

## 2025-07-14 DIAGNOSIS — I45.5 OTHER SPECIFIED HEART BLOCK: ICD-10-CM

## 2025-07-14 DIAGNOSIS — I44.2 COMPLETE HEART BLOCK: ICD-10-CM

## 2025-07-14 LAB
AORTIC VALVE MEAN GRADIENT: 4 MMHG
AORTIC VALVE PEAK VELOCITY: 1.46 M/S
AV PEAK GRADIENT: 9 MMHG
AVA (PEAK VEL): 1.78 CM2
AVA (VTI): 1.75 CM2
EJECTION FRACTION APICAL 4 CHAMBER: 51
EJECTION FRACTION: 48 %
LEFT ATRIUM VOLUME AREA LENGTH INDEX BSA: 16.4 ML/M2
LEFT VENTRICLE INTERNAL DIMENSION DIASTOLE: 4.5 CM (ref 3.5–6)
LEFT VENTRICULAR OUTFLOW TRACT DIAMETER: 1.85 CM
MITRAL VALVE E/A RATIO: 1.1
RIGHT VENTRICLE FREE WALL PEAK S': 1.1 CM/S
RIGHT VENTRICLE PEAK SYSTOLIC PRESSURE: 29 MMHG
TRICUSPID ANNULAR PLANE SYSTOLIC EXCURSION: 2.2 CM

## 2025-07-14 PROCEDURE — 93306 TTE W/DOPPLER COMPLETE: CPT | Performed by: STUDENT IN AN ORGANIZED HEALTH CARE EDUCATION/TRAINING PROGRAM

## 2025-07-14 PROCEDURE — 2500000004 HC RX 250 GENERAL PHARMACY W/ HCPCS (ALT 636 FOR OP/ED): Performed by: INTERNAL MEDICINE

## 2025-07-14 PROCEDURE — C8929 TTE W OR WO FOL WCON,DOPPLER: HCPCS

## 2025-07-14 RX ADMIN — PERFLUTREN 2 ML OF DILUTION: 6.52 INJECTION, SUSPENSION INTRAVENOUS at 09:59

## 2025-07-21 ENCOUNTER — APPOINTMENT (OUTPATIENT)
Dept: CARDIOLOGY | Facility: CLINIC | Age: 70
End: 2025-07-21
Payer: MEDICARE

## 2025-08-05 ENCOUNTER — HOSPITAL ENCOUNTER (OUTPATIENT)
Dept: CARDIOLOGY | Facility: CLINIC | Age: 70
Discharge: HOME | End: 2025-08-05
Payer: MEDICARE

## 2025-08-05 ENCOUNTER — APPOINTMENT (OUTPATIENT)
Dept: CARDIOLOGY | Facility: CLINIC | Age: 70
End: 2025-08-05
Payer: MEDICARE

## 2025-08-05 DIAGNOSIS — Z95.0 PRESENCE OF CARDIAC PACEMAKER: ICD-10-CM

## 2025-08-05 DIAGNOSIS — I44.2 COMPLETE HEART BLOCK: ICD-10-CM

## 2025-09-06 ENCOUNTER — HOSPITAL ENCOUNTER (EMERGENCY)
Facility: HOSPITAL | Age: 70
Discharge: HOME | End: 2025-09-06
Payer: MEDICARE

## 2025-09-06 ENCOUNTER — APPOINTMENT (OUTPATIENT)
Dept: RADIOLOGY | Facility: HOSPITAL | Age: 70
End: 2025-09-06
Payer: MEDICARE

## 2025-09-06 VITALS
TEMPERATURE: 97.5 F | SYSTOLIC BLOOD PRESSURE: 124 MMHG | RESPIRATION RATE: 18 BRPM | WEIGHT: 148 LBS | OXYGEN SATURATION: 98 % | DIASTOLIC BLOOD PRESSURE: 80 MMHG | BODY MASS INDEX: 27.23 KG/M2 | HEART RATE: 65 BPM | HEIGHT: 62 IN
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
## 2025-09-06 DIAGNOSIS — S02.2XXA CLOSED FRACTURE OF NASAL BONE, INITIAL ENCOUNTER: ICD-10-CM

## 2025-09-06 DIAGNOSIS — J32.9 CHRONIC SINUSITIS, UNSPECIFIED LOCATION: ICD-10-CM

## 2025-09-06 PROCEDURE — 2500000004 HC RX 250 GENERAL PHARMACY W/ HCPCS (ALT 636 FOR OP/ED)

## 2025-09-06 PROCEDURE — 70486 CT MAXILLOFACIAL W/O DYE: CPT

## 2025-09-06 PROCEDURE — 96372 THER/PROPH/DIAG INJ SC/IM: CPT

## 2025-09-06 PROCEDURE — 70450 CT HEAD/BRAIN W/O DYE: CPT

## 2025-09-06 PROCEDURE — 99284 EMERGENCY DEPT VISIT MOD MDM: CPT | Mod: 25

## 2025-09-06 PROCEDURE — 72125 CT NECK SPINE W/O DYE: CPT

## 2025-09-06 PROCEDURE — 2500000001 HC RX 250 WO HCPCS SELF ADMINISTERED DRUGS (ALT 637 FOR MEDICARE OP)

## 2025-09-06 PROCEDURE — 76377 3D RENDER W/INTRP POSTPROCES: CPT

## 2025-09-06 RX ORDER — ORPHENADRINE CITRATE 30 MG/ML
60 INJECTION INTRAMUSCULAR; INTRAVENOUS ONCE
Status: COMPLETED | OUTPATIENT
Start: 2025-09-06 | End: 2025-09-06

## 2025-09-06 RX ORDER — TRAMADOL HYDROCHLORIDE 50 MG/1
50 TABLET, FILM COATED ORAL EVERY 8 HOURS PRN
Qty: 10 TABLET | Refills: 0 | Status: SHIPPED | OUTPATIENT
Start: 2025-09-06 | End: 2025-09-11

## 2025-09-06 RX ORDER — CYCLOBENZAPRINE HCL 10 MG
10 TABLET ORAL 3 TIMES DAILY PRN
Qty: 20 TABLET | Refills: 0 | Status: SHIPPED | OUTPATIENT
Start: 2025-09-06 | End: 2025-09-13

## 2025-09-06 RX ORDER — TRAMADOL HYDROCHLORIDE 50 MG/1
50 TABLET, FILM COATED ORAL EVERY 8 HOURS PRN
Status: DISCONTINUED | OUTPATIENT
Start: 2025-09-06 | End: 2025-09-07 | Stop reason: HOSPADM

## 2025-09-06 RX ADMIN — ORPHENADRINE CITRATE 60 MG: 60 INJECTION INTRAMUSCULAR; INTRAVENOUS at 19:24

## 2025-09-06 RX ADMIN — TRAMADOL HYDROCHLORIDE 50 MG: 50 TABLET, COATED ORAL at 19:24

## 2025-09-06 ASSESSMENT — PAIN DESCRIPTION - PAIN TYPE: TYPE: ACUTE PAIN

## 2025-09-06 ASSESSMENT — PAIN DESCRIPTION - LOCATION: LOCATION: NECK

## 2025-09-06 ASSESSMENT — PAIN - FUNCTIONAL ASSESSMENT: PAIN_FUNCTIONAL_ASSESSMENT: 0-10

## 2025-09-06 ASSESSMENT — PAIN SCALES - GENERAL: PAINLEVEL_OUTOF10: 10 - WORST POSSIBLE PAIN

## 2025-10-21 ENCOUNTER — APPOINTMENT (OUTPATIENT)
Dept: PRIMARY CARE | Facility: CLINIC | Age: 70
End: 2025-10-21
Payer: MEDICARE

## (undated) DEVICE — Device

## (undated) DEVICE — FORCEP, BIOPOLAR, ADSON, NON INSUL, 5IN 1.0MM

## (undated) DEVICE — BANDAGE, STRETCH, CONFORM, 2 IN X 4.1 YD, STERILE

## (undated) DEVICE — PADDING, CAST, COTTON, 3 IN X 4 YD

## (undated) DEVICE — DRESSING, GAUZE, PETROLATUM, PATCH, XEROFORM, 1 X 8 IN, STERILE

## (undated) DEVICE — BANDAGE, ELASTIC, PREMIUM, SELF-CLOSE, 2 IN X 5 YD, STERILE